# Patient Record
Sex: FEMALE | Race: WHITE | URBAN - METROPOLITAN AREA
[De-identification: names, ages, dates, MRNs, and addresses within clinical notes are randomized per-mention and may not be internally consistent; named-entity substitution may affect disease eponyms.]

---

## 2022-12-29 ENCOUNTER — APPOINTMENT (OUTPATIENT)
Dept: CT IMAGING | Age: 77
DRG: 483 | End: 2022-12-29
Payer: MEDICARE

## 2022-12-29 ENCOUNTER — APPOINTMENT (OUTPATIENT)
Dept: GENERAL RADIOLOGY | Age: 77
DRG: 483 | End: 2022-12-29
Payer: MEDICARE

## 2022-12-29 ENCOUNTER — HOSPITAL ENCOUNTER (INPATIENT)
Age: 77
LOS: 5 days | Discharge: NURSING, REHAB OR LONG-TERM CARE | DRG: 483 | End: 2023-01-03
Attending: EMERGENCY MEDICINE | Admitting: INTERNAL MEDICINE
Payer: MEDICARE

## 2022-12-29 DIAGNOSIS — S42.211A FX HUMERAL NECK, RIGHT, CLOSED, INITIAL ENCOUNTER: ICD-10-CM
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
## 2022-12-29 DIAGNOSIS — S82.832A CLOSED FRACTURE OF DISTAL END OF LEFT FIBULA, UNSPECIFIED FRACTURE MORPHOLOGY, INITIAL ENCOUNTER: ICD-10-CM

## 2022-12-29 PROBLEM — R00.0 SINUS TACHYCARDIA: Status: ACTIVE | Noted: 2022-12-29

## 2022-12-29 PROBLEM — S82.201B TIBIA/FIBULA FRACTURE, RIGHT, OPEN TYPE I OR II, INITIAL ENCOUNTER: Status: ACTIVE | Noted: 2022-12-29

## 2022-12-29 PROBLEM — S82.401B TIBIA/FIBULA FRACTURE, RIGHT, OPEN TYPE I OR II, INITIAL ENCOUNTER: Status: ACTIVE | Noted: 2022-12-29

## 2022-12-29 PROBLEM — E66.01 MORBID OBESITY (HCC): Status: ACTIVE | Noted: 2022-12-29

## 2022-12-29 LAB
ABO/RH: NORMAL
ANION GAP SERPL CALCULATED.3IONS-SCNC: 17 MMOL/L (ref 3–16)
ANTIBODY SCREEN: NORMAL
BASOPHILS ABSOLUTE: 0 K/UL (ref 0–0.2)
BASOPHILS RELATIVE PERCENT: 0.2 %
BILIRUBIN URINE: NEGATIVE
BLOOD, URINE: NEGATIVE
BUN BLDV-MCNC: 21 MG/DL (ref 7–20)
CALCIUM SERPL-MCNC: 9.7 MG/DL (ref 8.3–10.6)
CHLORIDE BLD-SCNC: 100 MMOL/L (ref 99–110)
CLARITY: CLEAR
CO2: 21 MMOL/L (ref 21–32)
COLOR: YELLOW
CREAT SERPL-MCNC: 0.9 MG/DL (ref 0.6–1.2)
EOSINOPHILS ABSOLUTE: 0.1 K/UL (ref 0–0.6)
EOSINOPHILS RELATIVE PERCENT: 0.3 %
GFR SERPL CREATININE-BSD FRML MDRD: >60 ML/MIN/{1.73_M2}
GLUCOSE BLD-MCNC: 135 MG/DL (ref 70–99)
GLUCOSE URINE: NEGATIVE MG/DL
HCT VFR BLD CALC: 36.4 % (ref 36–48)
HEMOGLOBIN: 12.5 G/DL (ref 12–16)
KETONES, URINE: NEGATIVE MG/DL
LEUKOCYTE ESTERASE, URINE: NEGATIVE
LYMPHOCYTES ABSOLUTE: 2.1 K/UL (ref 1–5.1)
LYMPHOCYTES RELATIVE PERCENT: 10.2 %
MCH RBC QN AUTO: 32.3 PG (ref 26–34)
MCHC RBC AUTO-ENTMCNC: 34.4 G/DL (ref 31–36)
MCV RBC AUTO: 94.2 FL (ref 80–100)
MICROSCOPIC EXAMINATION: NORMAL
MONOCYTES ABSOLUTE: 1.5 K/UL (ref 0–1.3)
MONOCYTES RELATIVE PERCENT: 7.3 %
NEUTROPHILS ABSOLUTE: 16.6 K/UL (ref 1.7–7.7)
NEUTROPHILS RELATIVE PERCENT: 82 %
NITRITE, URINE: NEGATIVE
PDW BLD-RTO: 13 % (ref 12.4–15.4)
PH UA: 5.5 (ref 5–8)
PLATELET # BLD: 332 K/UL (ref 135–450)
PMV BLD AUTO: 8.4 FL (ref 5–10.5)
POTASSIUM REFLEX MAGNESIUM: 3.8 MMOL/L (ref 3.5–5.1)
PROTEIN UA: NEGATIVE MG/DL
RBC # BLD: 3.86 M/UL (ref 4–5.2)
SODIUM BLD-SCNC: 138 MMOL/L (ref 136–145)
SPECIFIC GRAVITY UA: >=1.03 (ref 1–1.03)
URINE REFLEX TO CULTURE: NORMAL
URINE TYPE: NORMAL
UROBILINOGEN, URINE: 0.2 E.U./DL
WBC # BLD: 20.3 K/UL (ref 4–11)

## 2022-12-29 PROCEDURE — 2580000003 HC RX 258: Performed by: INTERNAL MEDICINE

## 2022-12-29 PROCEDURE — 6360000002 HC RX W HCPCS: Performed by: STUDENT IN AN ORGANIZED HEALTH CARE EDUCATION/TRAINING PROGRAM

## 2022-12-29 PROCEDURE — 73030 X-RAY EXAM OF SHOULDER: CPT

## 2022-12-29 PROCEDURE — 96374 THER/PROPH/DIAG INJ IV PUSH: CPT

## 2022-12-29 PROCEDURE — 73200 CT UPPER EXTREMITY W/O DYE: CPT

## 2022-12-29 PROCEDURE — 51701 INSERT BLADDER CATHETER: CPT

## 2022-12-29 PROCEDURE — 70450 CT HEAD/BRAIN W/O DYE: CPT

## 2022-12-29 PROCEDURE — 73060 X-RAY EXAM OF HUMERUS: CPT

## 2022-12-29 PROCEDURE — 1200000000 HC SEMI PRIVATE

## 2022-12-29 PROCEDURE — 2580000003 HC RX 258: Performed by: STUDENT IN AN ORGANIZED HEALTH CARE EDUCATION/TRAINING PROGRAM

## 2022-12-29 PROCEDURE — 71045 X-RAY EXAM CHEST 1 VIEW: CPT

## 2022-12-29 PROCEDURE — 99285 EMERGENCY DEPT VISIT HI MDM: CPT

## 2022-12-29 PROCEDURE — 90715 TDAP VACCINE 7 YRS/> IM: CPT | Performed by: STUDENT IN AN ORGANIZED HEALTH CARE EDUCATION/TRAINING PROGRAM

## 2022-12-29 PROCEDURE — 86850 RBC ANTIBODY SCREEN: CPT

## 2022-12-29 PROCEDURE — 90471 IMMUNIZATION ADMIN: CPT | Performed by: STUDENT IN AN ORGANIZED HEALTH CARE EDUCATION/TRAINING PROGRAM

## 2022-12-29 PROCEDURE — A4216 STERILE WATER/SALINE, 10 ML: HCPCS | Performed by: INTERNAL MEDICINE

## 2022-12-29 PROCEDURE — 73590 X-RAY EXAM OF LOWER LEG: CPT

## 2022-12-29 PROCEDURE — 81003 URINALYSIS AUTO W/O SCOPE: CPT

## 2022-12-29 PROCEDURE — 72125 CT NECK SPINE W/O DYE: CPT

## 2022-12-29 PROCEDURE — 86901 BLOOD TYPING SEROLOGIC RH(D): CPT

## 2022-12-29 PROCEDURE — 6370000000 HC RX 637 (ALT 250 FOR IP): Performed by: STUDENT IN AN ORGANIZED HEALTH CARE EDUCATION/TRAINING PROGRAM

## 2022-12-29 PROCEDURE — 73610 X-RAY EXAM OF ANKLE: CPT

## 2022-12-29 PROCEDURE — 6370000000 HC RX 637 (ALT 250 FOR IP): Performed by: INTERNAL MEDICINE

## 2022-12-29 PROCEDURE — 85025 COMPLETE CBC W/AUTO DIFF WBC: CPT

## 2022-12-29 PROCEDURE — 86900 BLOOD TYPING SEROLOGIC ABO: CPT

## 2022-12-29 PROCEDURE — 96375 TX/PRO/DX INJ NEW DRUG ADDON: CPT

## 2022-12-29 PROCEDURE — 76377 3D RENDER W/INTRP POSTPROCES: CPT

## 2022-12-29 PROCEDURE — 80048 BASIC METABOLIC PNL TOTAL CA: CPT

## 2022-12-29 PROCEDURE — 2500000003 HC RX 250 WO HCPCS: Performed by: INTERNAL MEDICINE

## 2022-12-29 RX ORDER — MORPHINE SULFATE 2 MG/ML
2 INJECTION, SOLUTION INTRAMUSCULAR; INTRAVENOUS
Status: DISCONTINUED | OUTPATIENT
Start: 2022-12-29 | End: 2023-01-03 | Stop reason: HOSPADM

## 2022-12-29 RX ORDER — SODIUM CHLORIDE 0.9 % (FLUSH) 0.9 %
5-40 SYRINGE (ML) INJECTION EVERY 12 HOURS SCHEDULED
Status: DISCONTINUED | OUTPATIENT
Start: 2022-12-29 | End: 2023-01-03 | Stop reason: HOSPADM

## 2022-12-29 RX ORDER — OXYCODONE HYDROCHLORIDE 5 MG/1
5 TABLET ORAL ONCE
Status: COMPLETED | OUTPATIENT
Start: 2022-12-29 | End: 2022-12-29

## 2022-12-29 RX ORDER — SODIUM CHLORIDE, SODIUM LACTATE, POTASSIUM CHLORIDE, CALCIUM CHLORIDE 600; 310; 30; 20 MG/100ML; MG/100ML; MG/100ML; MG/100ML
1000 INJECTION, SOLUTION INTRAVENOUS CONTINUOUS
Status: ACTIVE | OUTPATIENT
Start: 2022-12-29 | End: 2022-12-29

## 2022-12-29 RX ORDER — LEVOTHYROXINE SODIUM 175 UG/1
175 TABLET ORAL DAILY
Status: DISCONTINUED | OUTPATIENT
Start: 2022-12-30 | End: 2023-01-03 | Stop reason: HOSPADM

## 2022-12-29 RX ORDER — ACETAMINOPHEN 650 MG/1
650 SUPPOSITORY RECTAL EVERY 6 HOURS PRN
Status: DISCONTINUED | OUTPATIENT
Start: 2022-12-29 | End: 2023-01-03 | Stop reason: HOSPADM

## 2022-12-29 RX ORDER — ACETAMINOPHEN 325 MG/1
650 TABLET ORAL EVERY 6 HOURS PRN
Status: DISCONTINUED | OUTPATIENT
Start: 2022-12-29 | End: 2023-01-03 | Stop reason: HOSPADM

## 2022-12-29 RX ORDER — SODIUM CHLORIDE 9 MG/ML
INJECTION, SOLUTION INTRAVENOUS PRN
Status: DISCONTINUED | OUTPATIENT
Start: 2022-12-29 | End: 2023-01-03 | Stop reason: HOSPADM

## 2022-12-29 RX ORDER — ONDANSETRON 2 MG/ML
4 INJECTION INTRAMUSCULAR; INTRAVENOUS EVERY 6 HOURS PRN
Status: DISCONTINUED | OUTPATIENT
Start: 2022-12-29 | End: 2023-01-03 | Stop reason: HOSPADM

## 2022-12-29 RX ORDER — MORPHINE SULFATE 2 MG/ML
4 INJECTION, SOLUTION INTRAMUSCULAR; INTRAVENOUS
Status: DISCONTINUED | OUTPATIENT
Start: 2022-12-29 | End: 2023-01-03 | Stop reason: HOSPADM

## 2022-12-29 RX ORDER — ONDANSETRON 2 MG/ML
4 INJECTION INTRAMUSCULAR; INTRAVENOUS EVERY 6 HOURS PRN
Status: DISCONTINUED | OUTPATIENT
Start: 2022-12-29 | End: 2022-12-29 | Stop reason: SDUPTHER

## 2022-12-29 RX ORDER — POLYETHYLENE GLYCOL 3350 17 G/17G
17 POWDER, FOR SOLUTION ORAL DAILY PRN
Status: DISCONTINUED | OUTPATIENT
Start: 2022-12-29 | End: 2023-01-03 | Stop reason: HOSPADM

## 2022-12-29 RX ORDER — SODIUM CHLORIDE 0.9 % (FLUSH) 0.9 %
5-40 SYRINGE (ML) INJECTION PRN
Status: DISCONTINUED | OUTPATIENT
Start: 2022-12-29 | End: 2023-01-03 | Stop reason: HOSPADM

## 2022-12-29 RX ORDER — OXYCODONE HYDROCHLORIDE AND ACETAMINOPHEN 5; 325 MG/1; MG/1
2 TABLET ORAL EVERY 4 HOURS PRN
Status: DISCONTINUED | OUTPATIENT
Start: 2022-12-29 | End: 2023-01-03 | Stop reason: HOSPADM

## 2022-12-29 RX ORDER — ACETAMINOPHEN 325 MG/1
650 TABLET ORAL ONCE
Status: COMPLETED | OUTPATIENT
Start: 2022-12-29 | End: 2022-12-29

## 2022-12-29 RX ORDER — OXYCODONE HYDROCHLORIDE AND ACETAMINOPHEN 5; 325 MG/1; MG/1
1 TABLET ORAL EVERY 4 HOURS PRN
Status: DISCONTINUED | OUTPATIENT
Start: 2022-12-29 | End: 2023-01-03 | Stop reason: HOSPADM

## 2022-12-29 RX ORDER — 0.9 % SODIUM CHLORIDE 0.9 %
1000 INTRAVENOUS SOLUTION INTRAVENOUS ONCE
Status: COMPLETED | OUTPATIENT
Start: 2022-12-29 | End: 2022-12-29

## 2022-12-29 RX ORDER — METHOCARBAMOL 500 MG/1
500 TABLET, FILM COATED ORAL ONCE
Status: COMPLETED | OUTPATIENT
Start: 2022-12-29 | End: 2022-12-29

## 2022-12-29 RX ORDER — IBUPROFEN 400 MG/1
800 TABLET ORAL ONCE
Status: COMPLETED | OUTPATIENT
Start: 2022-12-29 | End: 2022-12-29

## 2022-12-29 RX ORDER — ONDANSETRON 4 MG/1
4 TABLET, ORALLY DISINTEGRATING ORAL EVERY 8 HOURS PRN
Status: DISCONTINUED | OUTPATIENT
Start: 2022-12-29 | End: 2023-01-03 | Stop reason: HOSPADM

## 2022-12-29 RX ADMIN — HYDROMORPHONE HYDROCHLORIDE 1 MG: 1 INJECTION, SOLUTION INTRAMUSCULAR; INTRAVENOUS; SUBCUTANEOUS at 17:46

## 2022-12-29 RX ADMIN — OXYCODONE HYDROCHLORIDE 5 MG: 5 TABLET ORAL at 15:18

## 2022-12-29 RX ADMIN — OXYCODONE AND ACETAMINOPHEN 2 TABLET: 5; 325 TABLET ORAL at 22:57

## 2022-12-29 RX ADMIN — ACETAMINOPHEN 650 MG: 325 TABLET ORAL at 15:18

## 2022-12-29 RX ADMIN — METHOCARBAMOL 500 MG: 500 TABLET ORAL at 15:18

## 2022-12-29 RX ADMIN — ONDANSETRON 4 MG: 2 INJECTION INTRAMUSCULAR; INTRAVENOUS at 17:46

## 2022-12-29 RX ADMIN — TETANUS TOXOID, REDUCED DIPHTHERIA TOXOID AND ACELLULAR PERTUSSIS VACCINE, ADSORBED 0.5 ML: 5; 2.5; 8; 8; 2.5 SUSPENSION INTRAMUSCULAR at 15:19

## 2022-12-29 RX ADMIN — FAMOTIDINE 20 MG: 10 INJECTION, SOLUTION INTRAVENOUS at 22:57

## 2022-12-29 RX ADMIN — IBUPROFEN 800 MG: 400 TABLET, FILM COATED ORAL at 15:18

## 2022-12-29 RX ADMIN — SODIUM CHLORIDE 1000 ML: 0.9 INJECTION, SOLUTION INTRAVENOUS at 20:57

## 2022-12-29 RX ADMIN — SODIUM CHLORIDE, POTASSIUM CHLORIDE, SODIUM LACTATE AND CALCIUM CHLORIDE 1000 ML: 600; 310; 30; 20 INJECTION, SOLUTION INTRAVENOUS at 17:45

## 2022-12-29 RX ADMIN — SODIUM CHLORIDE, PRESERVATIVE FREE 10 ML: 5 INJECTION INTRAVENOUS at 22:59

## 2022-12-29 ASSESSMENT — PAIN DESCRIPTION - LOCATION
LOCATION_2: SHOULDER
LOCATION: ANKLE

## 2022-12-29 ASSESSMENT — PAIN DESCRIPTION - DESCRIPTORS
DESCRIPTORS: ACHING;DISCOMFORT
DESCRIPTORS_2: ACHING;DISCOMFORT

## 2022-12-29 ASSESSMENT — PAIN SCALES - GENERAL
PAINLEVEL_OUTOF10: 10
PAINLEVEL_OUTOF10: 0
PAINLEVEL_OUTOF10: 7

## 2022-12-29 ASSESSMENT — PAIN DESCRIPTION - PAIN TYPE: TYPE: ACUTE PAIN

## 2022-12-29 ASSESSMENT — PAIN DESCRIPTION - FREQUENCY: FREQUENCY: CONTINUOUS

## 2022-12-29 ASSESSMENT — PAIN - FUNCTIONAL ASSESSMENT
PAIN_FUNCTIONAL_ASSESSMENT: PREVENTS OR INTERFERES SOME ACTIVE ACTIVITIES AND ADLS
PAIN_FUNCTIONAL_ASSESSMENT_SITE2: PREVENTS OR INTERFERES SOME ACTIVE ACTIVITIES AND ADLS
PAIN_FUNCTIONAL_ASSESSMENT: 0-10
PAIN_FUNCTIONAL_ASSESSMENT: 0-10

## 2022-12-29 ASSESSMENT — PAIN DESCRIPTION - INTENSITY: RATING_2: 7

## 2022-12-29 ASSESSMENT — ENCOUNTER SYMPTOMS
NAUSEA: 0
COUGH: 0
EYE DISCHARGE: 0
RHINORRHEA: 0
BACK PAIN: 0
SHORTNESS OF BREATH: 0
EYE REDNESS: 0
VOMITING: 0
ABDOMINAL PAIN: 0

## 2022-12-29 ASSESSMENT — PAIN DESCRIPTION - ORIENTATION
ORIENTATION_2: RIGHT
ORIENTATION: LEFT

## 2022-12-29 ASSESSMENT — PAIN DESCRIPTION - ONSET: ONSET: ON-GOING

## 2022-12-29 NOTE — ED PROVIDER NOTES
ED Attending Attestation Note     Date of evaluation: 12/29/2022    This patient was seen by the resident. I have seen and examined the patient, agree with the workup, evaluation, management and diagnosis. The care plan has been discussed. My assessment reveals a 67-year-old female who presents with a chief complaint of fall. Patient had a mechanical fall. Complaining of pain in her arm and leg. On exam patient with ecchymosis around her left eye, good extraocular movements. Some bruising to the right shoulder noted. Benign abdomen. Claribel Ornelas GCS 15.      Angelica Martines MD  12/29/22 [id number]

## 2022-12-29 NOTE — ED PROVIDER NOTES
[facility]          EM RESIDENT NOTE       Date of evaluation: 12/29/2022    Chief Complaint   Fall    History of Present Illness     Jewels Raygoza is a 69 y.o. female with history of arthritis, HTN, HLD, rheumatic fever who presents right arm pain after a fall. Patient reports that she was walking outside when she tripped on some rocks and fell forward. She struck her left forehead, left upper arm, and right upper arm. She subsequently called 911 and was brought into the emergency department by EMS for further evaluation. Her primary complaint is of right upper arm pain. Describes a moderate intensity, sharp pain that is worse with movement and improved when elevated off of the bed. She did not lose consciousness but did hit the left side of her head. She has no pain with movement of her eyes. She has no nausea, vomiting, or abdominal pain. She has no chest pain or trouble breathing. She has no headache or neck pain. She is not on a blood thinner. Review of Systems     Review of Systems   Constitutional:  Negative for chills and fever. HENT:  Negative for congestion and rhinorrhea. Eyes:  Negative for discharge and redness. Respiratory:  Negative for cough and shortness of breath. Cardiovascular:  Negative for chest pain and palpitations. Gastrointestinal:  Negative for abdominal pain, nausea and vomiting. Genitourinary:  Negative for dysuria and hematuria. Musculoskeletal:  Positive for myalgias. Negative for back pain and joint swelling. Skin:  Positive for wound. Negative for rash. Neurological:  Negative for seizures and syncope. Hematological:  Negative for adenopathy. Does not bruise/bleed easily. Psychiatric/Behavioral:  Negative for agitation and behavioral problems.       Past Medical, Surgical, Family, and Social History     She has a past medical history of Arthritis, Blood transfusion, Hyperlipidemia, Hypertension, Hypothyroid, and Rheumatic fever. She has a past surgical history that includes Bunionectomy (1993) and Cholecystectomy (1997). Her family history includes Breast Cancer in her sister; Cancer in her sister; Diabetes in her mother; Hypertension in her mother. She reports that she quit smoking about 10 years ago. She has never used smokeless tobacco. She reports that she does not drink alcohol and does not use drugs. Medications     Previous Medications    CYANOCOBALAMIN 1000 MCG/ML INJECTION    Inject 1 mL into the skin once for 1 dose. LEVOTHYROXINE (SYNTHROID) 175 MCG TABLET    Take 1 tablet by mouth  daily. Take additional 1/2  tablet by mouth on Monday  and Wednesday    LISINOPRIL-HYDROCHLOROTHIAZIDE (PRINZIDE;ZESTORETIC) 20-25 MG PER TABLET    Take 0.5 tablets by mouth daily. MELOXICAM (MOBIC) 15 MG TABLET    TAKE ONE TABLET BY MOUTH EVERY DAY    MULTIVITAMIN (THERAGRAN) PER TABLET    Take 1 tablet by mouth daily. Allergies     She has No Known Allergies.     Physical Exam     INITIAL VITALS: BP: (!) 167/77, Temp: 98.8 °F (37.1 °C), Heart Rate: 88, Resp: 18, SpO2: 97 %   Physical Exam  Constitutional: 69 y.o. female, well-appearing, NAD, non-toxic  Head: normocephalic, ecchymosis and swelling over left forehead  Eyes: anicteric, PERRL, EOMI   ENT: no nasal discharge, no epistaxis, no septal hematoma, mucous membranes are moist, no intraoral bleeding or lesion  Neck: supple, full ROM, trachea midline, no midline cervical spine tenderness  Pulmonary: lungs clear to auscultation bilaterally with symmetric aeration, no wheezes/rales/rhonchi, no stridor   Cardiac: regular rate and regular rhythm, no murmurs/rubs/gallops   Abdomen: soft, non-tender, non-distended, there is a small abrasion over anterior abdominal skin  Extremities: warm and well-perfused, no apparent long bone deformity, no peripheral edema, ecchymosis over left humerus, tenderness to palpation over left humerus, normal ROM of elbow and hand on right, normal ROM of left upper extremity, there is an abrasion over left ankle  Skin: no rashes, some ecchymosis as noted before  Neuro: alert and oriented x3, speech normal, moves upper and lower extremities spontaneously and symmetrically  Psych: mood and affect appropriate for situation     DiagnosticResults   RADIOLOGY:  CT CERVICAL SPINE WO CONTRAST   Final Result      No fracture seen. Moderately advanced multilevel spondylotic change. CT HEAD WO CONTRAST   Final Result      No evidence of acute intracranial abnormality. XR CHEST PORTABLE   Final Result      Cardiomegaly. No acute pulmonary abnormality. XR HUMERUS RIGHT (MIN 2 VIEWS)   Final Result      Acute impacted fracture of the right humeral neck and humeral head         2 VIEWS OF THE LEFT HUMERUS on 12/29/2022      HISTORY: Pain and trauma. PROCEDURE:      AP and lateral  views were obtained. FINDINGS:      There is no sign of a discrete fracture-dislocation or joint effusion. There is no bony defect identified or loose body. There is no radiopaque foreign body      The AC joint and glenohumeral joint reveal degenerative changes      IMPRESSION:      No sign of any fracture or dislocation. Chronic degenerative changes of the glenohumeral and AC joint. TWO VIEWS OF THE  RIGHT SHOULDER on 12/29/2022      HISTORY: shoulder pain. PROCEDURE:      AP internal/external rotation views were obtained. FINDINGS:      There is impacted right proximal humeral neck oblique fracture and comminuted humeral head fracture with multiple fragments and lateral positioning of the distal humerus in relation humeral head. There is AC joint hypertrophy. No sign of any dislocation. IMPRESSION:      Comminuted and impacted right humeral neck and head fracture, without gross dislocation      LEFT TIBIA/FIBULA 2 VIEWS on 12/29/2022      HISTORY: Pain.  Fell      PROCEDURE:      AP and lateral views were obtained, including the knee and ankle joints. FINDINGS:      There is normal alignment of the proximal and mid tibia and fibula. There is distal oblique fibular and lateral malleolus fracture. The knee is severely arthritic with severe medial lateral compartment narrowing and condylar spurring. There is no    radiopaque foreign body. IMPRESSION:      Normal radiographs of the proximal left tibia and fibula. Advanced arthritic changes noted at the knee with severe medial and lateral compartment narrowing condylar spurring and demineralization. . Distal left fibular fracture is noted extending in the    lateral malleolus         XR HUMERUS LEFT (MIN 2 VIEWS)   Final Result      Acute impacted fracture of the right humeral neck and humeral head         2 VIEWS OF THE LEFT HUMERUS on 12/29/2022      HISTORY: Pain and trauma. PROCEDURE:      AP and lateral  views were obtained. FINDINGS:      There is no sign of a discrete fracture-dislocation or joint effusion. There is no bony defect identified or loose body. There is no radiopaque foreign body      The AC joint and glenohumeral joint reveal degenerative changes      IMPRESSION:      No sign of any fracture or dislocation. Chronic degenerative changes of the glenohumeral and AC joint. TWO VIEWS OF THE  RIGHT SHOULDER on 12/29/2022      HISTORY: shoulder pain. PROCEDURE:      AP internal/external rotation views were obtained. FINDINGS:      There is impacted right proximal humeral neck oblique fracture and comminuted humeral head fracture with multiple fragments and lateral positioning of the distal humerus in relation humeral head. There is AC joint hypertrophy. No sign of any dislocation. IMPRESSION:      Comminuted and impacted right humeral neck and head fracture, without gross dislocation      LEFT TIBIA/FIBULA 2 VIEWS on 12/29/2022      HISTORY: Pain.  Fell      PROCEDURE:      AP and lateral views were obtained, including the knee and ankle joints. FINDINGS:      There is normal alignment of the proximal and mid tibia and fibula. There is distal oblique fibular and lateral malleolus fracture. The knee is severely arthritic with severe medial lateral compartment narrowing and condylar spurring. There is no    radiopaque foreign body. IMPRESSION:      Normal radiographs of the proximal left tibia and fibula. Advanced arthritic changes noted at the knee with severe medial and lateral compartment narrowing condylar spurring and demineralization. . Distal left fibular fracture is noted extending in the    lateral malleolus         XR SHOULDER RIGHT (MIN 2 VIEWS)   Final Result      Acute impacted fracture of the right humeral neck and humeral head         2 VIEWS OF THE LEFT HUMERUS on 12/29/2022      HISTORY: Pain and trauma. PROCEDURE:      AP and lateral  views were obtained. FINDINGS:      There is no sign of a discrete fracture-dislocation or joint effusion. There is no bony defect identified or loose body. There is no radiopaque foreign body      The AC joint and glenohumeral joint reveal degenerative changes      IMPRESSION:      No sign of any fracture or dislocation. Chronic degenerative changes of the glenohumeral and AC joint. TWO VIEWS OF THE  RIGHT SHOULDER on 12/29/2022      HISTORY: shoulder pain. PROCEDURE:      AP internal/external rotation views were obtained. FINDINGS:      There is impacted right proximal humeral neck oblique fracture and comminuted humeral head fracture with multiple fragments and lateral positioning of the distal humerus in relation humeral head. There is AC joint hypertrophy. No sign of any dislocation. IMPRESSION:      Comminuted and impacted right humeral neck and head fracture, without gross dislocation      LEFT TIBIA/FIBULA 2 VIEWS on 12/29/2022      HISTORY: Pain.  Fell      PROCEDURE:      AP and lateral views were obtained, including the knee and ankle joints. FINDINGS:      There is normal alignment of the proximal and mid tibia and fibula. There is distal oblique fibular and lateral malleolus fracture. The knee is severely arthritic with severe medial lateral compartment narrowing and condylar spurring. There is no    radiopaque foreign body. IMPRESSION:      Normal radiographs of the proximal left tibia and fibula. Advanced arthritic changes noted at the knee with severe medial and lateral compartment narrowing condylar spurring and demineralization. . Distal left fibular fracture is noted extending in the    lateral malleolus         XR ANKLE LEFT (MIN 3 VIEWS)   Final Result      Comminuted oblique fracture of the distal fibula and lateral malleolus extending into the syndesmosis of the ankle without discrete fracture of the posterior or medial malleolus at this time. Diffuse soft tissue swelling noted         XR TIBIA FIBULA LEFT (2 VIEWS)   Final Result      Acute impacted fracture of the right humeral neck and humeral head         2 VIEWS OF THE LEFT HUMERUS on 12/29/2022      HISTORY: Pain and trauma. PROCEDURE:      AP and lateral  views were obtained. FINDINGS:      There is no sign of a discrete fracture-dislocation or joint effusion. There is no bony defect identified or loose body. There is no radiopaque foreign body      The AC joint and glenohumeral joint reveal degenerative changes      IMPRESSION:      No sign of any fracture or dislocation. Chronic degenerative changes of the glenohumeral and AC joint. TWO VIEWS OF THE  RIGHT SHOULDER on 12/29/2022      HISTORY: shoulder pain. PROCEDURE:      AP internal/external rotation views were obtained. FINDINGS:      There is impacted right proximal humeral neck oblique fracture and comminuted humeral head fracture with multiple fragments and lateral positioning of the distal humerus in relation humeral head. There is AC joint hypertrophy.  No sign of any dislocation. IMPRESSION:      Comminuted and impacted right humeral neck and head fracture, without gross dislocation      LEFT TIBIA/FIBULA 2 VIEWS on 12/29/2022      HISTORY: Pain. Fell      PROCEDURE:      AP and lateral views were obtained, including the knee and ankle joints. FINDINGS:      There is normal alignment of the proximal and mid tibia and fibula. There is distal oblique fibular and lateral malleolus fracture. The knee is severely arthritic with severe medial lateral compartment narrowing and condylar spurring. There is no    radiopaque foreign body. IMPRESSION:      Normal radiographs of the proximal left tibia and fibula. Advanced arthritic changes noted at the knee with severe medial and lateral compartment narrowing condylar spurring and demineralization. . Distal left fibular fracture is noted extending in the    lateral malleolus             LABS:   Results for orders placed or performed during the hospital encounter of 12/29/22   BMP w/ Reflex to MG   Result Value Ref Range    Sodium 138 136 - 145 mmol/L    Potassium reflex Magnesium 3.8 3.5 - 5.1 mmol/L    Chloride 100 99 - 110 mmol/L    CO2 21 21 - 32 mmol/L    Anion Gap 17 (H) 3 - 16    Glucose 135 (H) 70 - 99 mg/dL    BUN 21 (H) 7 - 20 mg/dL    Creatinine 0.9 0.6 - 1.2 mg/dL    Est, Glom Filt Rate >60 >60    Calcium 9.7 8.3 - 10.6 mg/dL   CBC with Auto Differential   Result Value Ref Range    WBC 20.3 (H) 4.0 - 11.0 K/uL    RBC 3.86 (L) 4.00 - 5.20 M/uL    Hemoglobin 12.5 12.0 - 16.0 g/dL    Hematocrit 36.4 36.0 - 48.0 %    MCV 94.2 80.0 - 100.0 fL    MCH 32.3 26.0 - 34.0 pg    MCHC 34.4 31.0 - 36.0 g/dL    RDW 13.0 12.4 - 15.4 %    Platelets 737 576 - 027 K/uL    MPV 8.4 5.0 - 10.5 fL    Neutrophils % 82.0 %    Lymphocytes % 10.2 %    Monocytes % 7.3 %    Eosinophils % 0.3 %    Basophils % 0.2 %    Neutrophils Absolute 16.6 (H) 1.7 - 7.7 K/uL    Lymphocytes Absolute 2.1 1.0 - 5.1 K/uL    Monocytes Absolute 1.5 (H) 0.0 - 1.3 K/uL    Eosinophils Absolute 0.1 0.0 - 0.6 K/uL    Basophils Absolute 0.0 0.0 - 0.2 K/uL   Urinalysis with Reflex to Culture    Specimen: Urine   Result Value Ref Range    Color, UA Yellow Straw/Yellow    Clarity, UA Clear Clear    Glucose, Ur Negative Negative mg/dL    Bilirubin Urine Negative Negative    Ketones, Urine Negative Negative mg/dL    Specific Gravity, UA >=1.030 1.005 - 1.030    Blood, Urine Negative Negative    pH, UA 5.5 5.0 - 8.0    Protein, UA Negative Negative mg/dL    Urobilinogen, Urine 0.2 <2.0 E.U./dL    Nitrite, Urine Negative Negative    Leukocyte Esterase, Urine Negative Negative    Microscopic Examination Not Indicated     Urine Type NotGiven     Urine Reflex to Culture Not Indicated    TYPE AND SCREEN   Result Value Ref Range    ABO/Rh O POS     Antibody Screen NEG        RECENT VITALS:  BP: 124/62, Temp: 98.8 °F (37.1 °C), Heart Rate: (!) 126,Resp: 20, SpO2: 92 %       ED Course     Nursing Notes, Past Medical Hx, Past Surgical Hx, Social Hx, Allergies, and Family Hx were reviewed. The patient was given the followingmedications:  Orders Placed This Encounter   Medications    oxyCODONE (ROXICODONE) immediate release tablet 5 mg    acetaminophen (TYLENOL) tablet 650 mg    ibuprofen (ADVIL;MOTRIN) tablet 800 mg    methocarbamol (ROBAXIN) tablet 500 mg    Tetanus-Diphth-Acell Pertussis (BOOSTRIX) injection 0.5 mL    HYDROmorphone (DILAUDID) injection 1 mg    ondansetron (ZOFRAN) injection 4 mg    lactated ringers infusion 1,000 mL    0.9 % sodium chloride bolus       CONSULTS:  IP CONSULT TO HOSPITALIST  IP CONSULT TO Carey Mayes / ASSESSMENT / Micah Thomas is a 69 y.o. female with history of arthritis, HTN, HLD, rheumatic fever who presents right arm pain after a mechanical fall. Initial impression notable for 30-year-old woman who was uncomfortable but otherwise nontoxic appearing.   Initial pain management with Tylenol, oxycodone, Robaxin, and ibuprofen. x-ray imaging of the chest was negative. X-ray imaging of the right humerus and shoulder was notable for a right humeral neck fracture. X-ray imaging of the left humerus is negative. X-ray imaging of the left ankle and tib-fib notable for a distal fibular fracture. Case discussed with orthopedics who recommended that the patient be n.p.o. at midnight. At this time IV access established and laboratory evaluation for possible operative intervention sent. Laboratory evaluation reviewed. CBC with leukocytosis at 20. Suspect reactive leukocytosis given traumatic injuries. There is no evidence of pneumonia on chest x-ray and her urinalysis is not consistent with a urinary tract infection. She has no other infectious signs or symptoms on evaluation she has a soft and benign abdomen. BMP is without electrolyte derangement or kidney injury. Further pain management given with Dilaudid. Case discussed with the hospital medicine service who kindly agreed to admit the patient. This patient was also evaluated by the attending physician. All care plans were discussed and agreed upon. Clinical Impression     1. Fall from standing, initial encounter    2. Fx humeral neck, right, closed, initial encounter    3.  Closed fracture of distal end of left fibula, unspecified fracture morphology, initial encounter        Disposition   DISPOSITION Admitted 12/29/2022 06:41:04 PM      Latanya Luevano MD  Resident  12/29/22 [id number]

## 2022-12-29 NOTE — ED NOTES
Back board removed from patient by this RN and radiology.    ER MD at bedside while back board was removed      Ben Weinberg RN  12/29/22 [phone]

## 2022-12-30 ENCOUNTER — ANESTHESIA EVENT (OUTPATIENT)
Dept: OPERATING ROOM | Age: 77
End: 2022-12-30
Payer: MEDICARE

## 2022-12-30 ENCOUNTER — APPOINTMENT (OUTPATIENT)
Dept: GENERAL RADIOLOGY | Age: 77
DRG: 483 | End: 2022-12-30
Payer: MEDICARE

## 2022-12-30 LAB
ANION GAP SERPL CALCULATED.3IONS-SCNC: 14 MMOL/L (ref 3–16)
BASOPHILS ABSOLUTE: 0 K/UL (ref 0–0.2)
BASOPHILS RELATIVE PERCENT: 0.6 %
BUN BLDV-MCNC: 19 MG/DL (ref 7–20)
CALCIUM SERPL-MCNC: 9.6 MG/DL (ref 8.3–10.6)
CHLORIDE BLD-SCNC: 101 MMOL/L (ref 99–110)
CO2: 23 MMOL/L (ref 21–32)
CREAT SERPL-MCNC: 1 MG/DL (ref 0.6–1.2)
EOSINOPHILS ABSOLUTE: 0 K/UL (ref 0–0.6)
EOSINOPHILS RELATIVE PERCENT: 0.5 %
GFR SERPL CREATININE-BSD FRML MDRD: 58 ML/MIN/{1.73_M2}
GLUCOSE BLD-MCNC: 118 MG/DL (ref 70–99)
HCT VFR BLD CALC: 31 % (ref 36–48)
HEMOGLOBIN: 10.6 G/DL (ref 12–16)
LYMPHOCYTES ABSOLUTE: 1.1 K/UL (ref 1–5.1)
LYMPHOCYTES RELATIVE PERCENT: 12.8 %
MCH RBC QN AUTO: 32.6 PG (ref 26–34)
MCHC RBC AUTO-ENTMCNC: 34.1 G/DL (ref 31–36)
MCV RBC AUTO: 95.6 FL (ref 80–100)
MONOCYTES ABSOLUTE: 1 K/UL (ref 0–1.3)
MONOCYTES RELATIVE PERCENT: 12.2 %
NEUTROPHILS ABSOLUTE: 6.3 K/UL (ref 1.7–7.7)
NEUTROPHILS RELATIVE PERCENT: 73.9 %
PDW BLD-RTO: 12.8 % (ref 12.4–15.4)
PLATELET # BLD: 263 K/UL (ref 135–450)
PMV BLD AUTO: 8.1 FL (ref 5–10.5)
POTASSIUM REFLEX MAGNESIUM: 4.1 MMOL/L (ref 3.5–5.1)
RBC # BLD: 3.24 M/UL (ref 4–5.2)
SODIUM BLD-SCNC: 138 MMOL/L (ref 136–145)
WBC # BLD: 8.5 K/UL (ref 4–11)

## 2022-12-30 PROCEDURE — 85025 COMPLETE CBC W/AUTO DIFF WBC: CPT

## 2022-12-30 PROCEDURE — 2500000003 HC RX 250 WO HCPCS: Performed by: INTERNAL MEDICINE

## 2022-12-30 PROCEDURE — 6370000000 HC RX 637 (ALT 250 FOR IP): Performed by: INTERNAL MEDICINE

## 2022-12-30 PROCEDURE — 36415 COLL VENOUS BLD VENIPUNCTURE: CPT

## 2022-12-30 PROCEDURE — 2580000003 HC RX 258: Performed by: INTERNAL MEDICINE

## 2022-12-30 PROCEDURE — A4216 STERILE WATER/SALINE, 10 ML: HCPCS | Performed by: INTERNAL MEDICINE

## 2022-12-30 PROCEDURE — 93005 ELECTROCARDIOGRAM TRACING: CPT | Performed by: INTERNAL MEDICINE

## 2022-12-30 PROCEDURE — 73630 X-RAY EXAM OF FOOT: CPT

## 2022-12-30 PROCEDURE — 1200000000 HC SEMI PRIVATE

## 2022-12-30 PROCEDURE — 80048 BASIC METABOLIC PNL TOTAL CA: CPT

## 2022-12-30 RX ORDER — BUPIVACAINE HYDROCHLORIDE 5 MG/ML
INJECTION, SOLUTION EPIDURAL; INTRACAUDAL
Status: DISPENSED
Start: 2022-12-30 | End: 2022-12-31

## 2022-12-30 RX ORDER — 0.9 % SODIUM CHLORIDE 0.9 %
1000 INTRAVENOUS SOLUTION INTRAVENOUS ONCE
Status: COMPLETED | OUTPATIENT
Start: 2022-12-30 | End: 2022-12-30

## 2022-12-30 RX ORDER — DEXAMETHASONE SODIUM PHOSPHATE 4 MG/ML
INJECTION, SOLUTION INTRA-ARTICULAR; INTRALESIONAL; INTRAMUSCULAR; INTRAVENOUS; SOFT TISSUE
Status: DISPENSED
Start: 2022-12-30 | End: 2022-12-31

## 2022-12-30 RX ORDER — DEXAMETHASONE SODIUM PHOSPHATE 10 MG/ML
INJECTION, SOLUTION INTRAMUSCULAR; INTRAVENOUS
Status: DISPENSED
Start: 2022-12-30 | End: 2022-12-31

## 2022-12-30 RX ADMIN — SODIUM CHLORIDE 1000 ML: 9 INJECTION, SOLUTION INTRAVENOUS at 10:12

## 2022-12-30 RX ADMIN — MICONAZOLE NITRATE: 20 POWDER TOPICAL at 20:03

## 2022-12-30 RX ADMIN — OXYCODONE AND ACETAMINOPHEN 2 TABLET: 5; 325 TABLET ORAL at 05:21

## 2022-12-30 RX ADMIN — OXYCODONE AND ACETAMINOPHEN 1 TABLET: 5; 325 TABLET ORAL at 16:13

## 2022-12-30 RX ADMIN — MICONAZOLE NITRATE: 20 POWDER TOPICAL at 09:30

## 2022-12-30 RX ADMIN — FAMOTIDINE 20 MG: 10 INJECTION, SOLUTION INTRAVENOUS at 09:26

## 2022-12-30 RX ADMIN — FAMOTIDINE 20 MG: 10 INJECTION, SOLUTION INTRAVENOUS at 19:56

## 2022-12-30 RX ADMIN — SODIUM CHLORIDE, PRESERVATIVE FREE 10 ML: 5 INJECTION INTRAVENOUS at 20:03

## 2022-12-30 RX ADMIN — SODIUM CHLORIDE, PRESERVATIVE FREE 10 ML: 5 INJECTION INTRAVENOUS at 09:29

## 2022-12-30 RX ADMIN — LEVOTHYROXINE SODIUM 175 MCG: 0.17 TABLET ORAL at 05:19

## 2022-12-30 RX ADMIN — OXYCODONE AND ACETAMINOPHEN 2 TABLET: 5; 325 TABLET ORAL at 20:57

## 2022-12-30 RX ADMIN — MICONAZOLE NITRATE: 20 POWDER TOPICAL at 00:00

## 2022-12-30 ASSESSMENT — PAIN DESCRIPTION - LOCATION
LOCATION: ARM;FOOT
LOCATION: ARM;FOOT
LOCATION: SHOULDER
LOCATION_2: ANKLE

## 2022-12-30 ASSESSMENT — PAIN DESCRIPTION - ONSET
ONSET: ON-GOING
ONSET: ON-GOING

## 2022-12-30 ASSESSMENT — PAIN DESCRIPTION - PAIN TYPE
TYPE: ACUTE PAIN
TYPE: ACUTE PAIN

## 2022-12-30 ASSESSMENT — PAIN - FUNCTIONAL ASSESSMENT
PAIN_FUNCTIONAL_ASSESSMENT: PREVENTS OR INTERFERES SOME ACTIVE ACTIVITIES AND ADLS
PAIN_FUNCTIONAL_ASSESSMENT: PREVENTS OR INTERFERES WITH MANY ACTIVE NOT PASSIVE ACTIVITIES
PAIN_FUNCTIONAL_ASSESSMENT_SITE2: PREVENTS OR INTERFERES WITH MANY ACTIVE NOT PASSIVE ACTIVITIES

## 2022-12-30 ASSESSMENT — PAIN DESCRIPTION - INTENSITY: RATING_2: 6

## 2022-12-30 ASSESSMENT — PAIN DESCRIPTION - DESCRIPTORS
DESCRIPTORS: ACHING;DISCOMFORT
DESCRIPTORS_2: ACHING;DISCOMFORT

## 2022-12-30 ASSESSMENT — PAIN SCALES - GENERAL
PAINLEVEL_OUTOF10: 7
PAINLEVEL_OUTOF10: 7
PAINLEVEL_OUTOF10: 5
PAINLEVEL_OUTOF10: 4

## 2022-12-30 ASSESSMENT — PAIN DESCRIPTION - ORIENTATION
ORIENTATION_2: LEFT
ORIENTATION: RIGHT
ORIENTATION: MID

## 2022-12-30 ASSESSMENT — PAIN DESCRIPTION - FREQUENCY
FREQUENCY: CONTINUOUS
FREQUENCY: CONTINUOUS

## 2022-12-30 NOTE — PLAN OF CARE
Problem: Safety - Adult  Goal: Free from fall injury  Outcome: Progressing   Bed locked in lowest position. Bed alarm on. Call light/belongings within reach. Problem: Pain  Goal: Verbalizes/displays adequate comfort level or baseline comfort level  Outcome: Progressing  Pain to LLE and RUE treated with PO and IV medication. Problem: Skin/Tissue Integrity  Goal: Absence of new skin breakdown  Outcome: Progressing  Excoriation to breasts/abdominal pannus/groin treated with miconazole powder. Specialty mattress in place with alternating pressure.

## 2022-12-30 NOTE — PROGRESS NOTES
Preop- RCRI 0, 3.9 %  30-day risk of death, MI, or cardiac arrest  Low risk surgery  S/p Type and screen  INR ordered stat this am  EKG reviewed, Sinus tachycardia, which is due to volume depletion and anxiety  Will give bolus of 1 L IVF    Hospitalist

## 2022-12-30 NOTE — CARE COORDINATION
Case Management Assessment  Initial Evaluation    Date/Time of Evaluation: 12/30/2022 12:42 PM  Assessment Completed by: Tima Null RN    If patient is discharged prior to next notation, then this note serves as note for discharge by case management. Patient Name: Emilie Lundberg                   YOB: 1945  Diagnosis: Fall from standing, initial encounter [W19. XXXA]  Fx humeral neck, right, closed, initial encounter [S42.211A]  Tibia/fibula fracture, right, open type I or II, initial encounter [S82.201B, S82.401B]  Closed fracture of distal end of left fibula, unspecified fracture morphology, initial encounter [N55.565X]                   Date / Time: 12/29/2022  2:32 PM    Patient Admission Status: Inpatient   Readmission Risk (Low < 19, Mod (19-27), High > 27): Readmission Risk Score: 13.7    Current PCP: Omid De La Rosa MD  PCP verified by CM? No    Chart Reviewed: Yes      History Provided by: Patient  Patient Orientation: Alert and Oriented    Patient Cognition: Alert    Hospitalization in the last 30 days (Readmission):  No    If yes, Readmission Assessment in CM Navigator will be completed. Advance Directives:      Code Status: Full Code   Patient's Primary Decision Maker is: Legal Next of Kin      Discharge Planning:    Patient lives with: Alone Type of Home: House  Primary Care Giver: Self  Patient Support Systems include: Children   Current Financial resources: Medicare  Current community resources: None  Current services prior to admission: None            Current DME: Cane            Type of Home Care services:  None    ADLS  Prior functional level: Independent in ADLs/IADLs  Current functional level: Independent in ADLs/IADLs    PT AM-PAC:   /24  OT AM-PAC:   /24    Family can provide assistance at DC: Yes  Would you like Case Management to discuss the discharge plan with any other family members/significant others, and if so, who?  No  Plans to Return to Present Housing: Unknown at present  Other Identified Issues/Barriers to RETURNING to current housing: none  Potential Assistance needed at discharge: Lucian Meza            Potential DME:    Patient expects to discharge to: [facility] [address] for transportation at discharge: Family    Financial    Payor: MEDICARE / Plan: MEDICARE PART A AND B / Product Type: *No Product type* /     Does insurance require precert for SNF: No    Potential assistance Purchasing Medications: No  Meds-to-Beds request:        [address] [address] [id number] Constantine Abreu [phone]  [address] [place] [id number]  Phone: [phone] Fax: [phone]    [facility] [place] Tad Quezada, Markie Ghosh [address] [place] [address] [phone] - f [phone]  [place] [address]  Phone: [phone] Fax: [phone]    [facility] ([facility]) - Yvan Bar[address] [facility] [phone] - F [phone]  [address] [address]  Phone: [phone] Fax: [phone]      Notes:    Factors facilitating achievement of predicted outcomes: Family support, Cooperative, and Pleasant    Barriers to discharge: Medical complications    Additional Case Management Notes:   Patient is from home alone, independent at home, son Jorge Zhao helps often, uses a 4 point cane at home, lives in [address] home with 3 steps to enter. Patient anticipates she will need rehab at discharge. The Plan for Transition of Care is related to the following treatment goals of Fall from standing, initial encounter [W19. XXXA]  Fx humeral neck, right, closed, initial encounter [S42.211A]  Tibia/fibula fracture, right, open type I or II, initial encounter [S82.201B, S82.401B]  Closed fracture of distal end of left fibula, unspecified fracture morphology, initial encounter [I38.993Q]    IF APPLICABLE: The Patient and/or patient representative Micah Craig and her family were provided with a choice of provider and agrees with the discharge plan. Freedom of choice list with basic dialogue that supports the patient's individualized plan of care/goals and shares the quality data associated with the providers was provided to: Patient   Patient Representative Name:       The Patient and/or Patient Representative Agree with the Discharge Plan?  Yes    Valarie Hair RN  Case Management Department  Ph: [phone] Fax: [phone]

## 2022-12-30 NOTE — PLAN OF CARE
Problem: Safety - Adult  Goal: Free from fall injury  12/30/2022 0835 by Deborah Lanier RN  Outcome: Progressing     Problem: Pain  Goal: Verbalizes/displays adequate comfort level or baseline comfort level  12/30/2022 0835 by Deborah Lanier RN  Outcome: Progressing     Problem: Discharge Planning  Goal: Discharge to home or other facility with appropriate resources  Outcome: Progressing     Problem: Skin/Tissue Integrity  Goal: Absence of new skin breakdown  Description: 1. Monitor for areas of redness and/or skin breakdown  2. Assess vascular access sites hourly  3. Every 4-6 hours minimum:  Change oxygen saturation probe site  4. Every 4-6 hours:  If on nasal continuous positive airway pressure, respiratory therapy assess nares and determine need for appliance change or resting period.   12/30/2022 0835 by Deborah Lanier RN  Outcome: Progressing     Problem: ABCDS Injury Assessment  Goal: Absence of physical injury  Outcome: Progressing

## 2022-12-30 NOTE — PROGRESS NOTES
ORTHO   Full note to follow  Minimally displaced L ankle lateral malleolus fracture  Displaced comminuted R proximal humerus fracture    Plan nonop treatment L ankle, WBAT in boot  Plan for R reverse total shoulder, CT pending

## 2022-12-30 NOTE — ED NOTES
SBAR report given to [facility] RN   Questions answered to [id number] Pallavi RN  12/29/22 2020

## 2022-12-30 NOTE — PROGRESS NOTES
4 Eyes Admission Assessment     I agree as the admission nurse that 2 RN's have performed a thorough Head to Toe Skin Assessment on the patient. ALL assessment sites listed below have been assessed on admission. Areas assessed by both nurses: Bernice Barriga RN    [x]   Head, Face, and Ears- L eye bruising, laceration   [x]   Shoulders, Back, and Chest  [x]   Arms, Elbows, and Hands- L arm laceration, R arm swelling, redness    [x]   Coccyx, Sacrum, and Ischium- redness  [x]   Legs, Feet, and Heels- scattered abrasions, redness     Excoriation to breasts, abdominal pannus, and groin       Does the Patient have Skin Breakdown?   No         Carlos Alberto Prevention initiated:  Yes   Wound Care Orders initiated:  No      [facility] nurse consulted for Pressure Injury (Stage 3,4, Unstageable, DTI, NWPT, and Complex wounds) or Carlos Alberto score 18 or lower:  No      Nurse 1 eSignature: Electronically signed by Sarika Weber RN on 12/29/22 at 10:03 PM EST    **SHARE this note so that the co-signing nurse is able to place an eSignature**    Nurse 2 eSignature: Electronically signed by Herman Hurd RN on 12/29/22 at 10:34 PM EST

## 2022-12-30 NOTE — PROGRESS NOTES
ORTHO  R comminuted displaced proximal humerus fracture - reverse total shoulder later today  L ankle fx - tall cast boot, WBAT  Will obtain L foot xrays, h/o pins s/p prior bunion surgery with MTP pain  Abx OCTOR  SW consult - rehab vs ECF post op

## 2022-12-30 NOTE — PROGRESS NOTES
Pt A&O, VSS. Pt complains of pain which is being managed with prn medication given per mar and repositioning. Weakness and limited movement noted in R arm and L leg. All fall and safety precautions in place, bed locked and in lowest position, call light and belongings within reach, pt denies further needs at this time.

## 2022-12-30 NOTE — H&P
Hospital Medicine History & Physical      PCP: Filippo Douglas MD    Date of Admission: 12/29/2022    Date of Service: Pt seen/examined on 12/30/22 and Admitted to Inpatient with expected LOS greater than two midnights due to medical therapy. Chief Complaint:  fall while getting her mail out      History Of Present Illness:     69 y.o. female Shauna Ruffin  -History of hypertension B12 deficiency hypothyroidism  -She went out to get her mail, slipped and hit her left side of the head on the mailbox and right hip on the stairs  -States her neighbor saw her and tried to get her up or unable to and EMS was called she was brought to the emergency room. -In the ED initial vitals were stable, labs showed BUN of 21 anion gap 17 WBC of 20.3 with left shift. UA was unremarkable although specific gravity greater than 1.030. Multiple imaging done included multiple imagings were done significant for comminuted oblique fracture distal fibula and lateral malleolus extending into the syndesmosis of the ankle without fracture of the posterior or medial malleolus. There was diffuse soft tissue swelling noted. Right shoulder impacted fracture right humeral neck and humeral head. Jen Blank was consulted, recommended reverse total shoulder, left ankle fracture would need to be in cast boot and weightbearing as tolerated. Patient to be admitted for further management    Past Medical History:          Diagnosis Date    Arthritis     Blood transfusion age 7    Hyperlipidemia     Hypertension     Hypothyroid     Rheumatic fever 1955       Past Surgical History:          Procedure Laterality Date    Voldi 78       Medications Prior to Admission:      Prior to Admission medications    Medication Sig Start Date End Date Taking? Authorizing Provider   ketoconazole (NIZORAL) 2 % cream Apply topically daily.  1/27/15   Filippo Douglas MD   meloxicam (MOBIC) 15 MG tablet TAKE ONE TABLET BY MOUTH EVERY DAY 12/1/14   Pee Reed MD   levothyroxine (SYNTHROID) 175 MCG tablet Take 1 tablet by mouth  daily. Take additional 1/2  tablet by mouth on Monday  and Wednesday 6/23/14   Pee Reed MD   lisinopril-hydrochlorothiazide (ZESTORETIC) 20-25 MG per tablet TAKE ONE TABLET BY MOUTH EVERY DAY 6/12/14   Pee Reed MD   Oral Medication Containers (MEDICATION CONTAINER/SMALL) MISC Take 5 mLs by mouth 4 times daily as needed. Miles Mixture:  80 cc Viscous Lidocaine, 20 mg Hydrocortisone, 100 mg Doxycycline, 2 million units Nystatin, qs up to 180 cc with Benadryl elixir (cherry or grape) 4/3/14   Pee Reed MD   potassium chloride (KLOR-CON) 8 MEQ tablet Take 1 tablet by mouth daily. 12/5/13   Pee Reed MD   lisinopril-hydrochlorothiazide (PRINZIDE;ZESTORETIC) 20-25 MG per tablet Take 0.5 tablets by mouth daily.      Historical MD Lashae   Cholecalciferol (VITAMIN D) 2000 UNITS TABS Take 1 tablet by mouth daily. 8/13/12   Pee Reed MD   cyanocobalamin 1000 MCG/ML injection Inject 1 mL into the skin once for 1 dose. 8/11/12 8/11/12  Susan Oconnor MD   docusate sodium 100 MG CAPS Take 100 mg by mouth daily. 8/11/12   Susan Oconnor MD   ferrous sulfate 325 (65 FE) MG EC tablet Take 1 tablet by mouth 2 times daily (with meals). 8/11/12   Susan Oconnor MD   multivitamin (THERAGRAN) per tablet Take 1 tablet by mouth daily. 8/11/12   Susan Oconnor MD       Allergies:  Patient has no known allergies.    Social History:      The patient currently lives at home alone     TOBACCO:   reports that she quit smoking about 10 years ago. She has never used smokeless tobacco.  ETOH:   reports no history of alcohol use.      Family History:    reviewed        Problem Relation Age of Onset    Breast Cancer Sister     Cancer Sister         breast    Hypertension Mother     Diabetes Mother        REVIEW OF SYSTEMS:   Pertinent positives as noted in the HPI. All other systems reviewed and negative.    PHYSICAL EXAM  PERFORMED:    /62   Pulse (!) 126   Temp 98.8 °F (37.1 °C) (Oral)   Resp 20   Ht 5' (1.524 m)   Wt 300 lb (136.1 kg)   SpO2 92%   BMI 58.59 kg/m²     General appearance: Mild to moderate distress due to pain  HEENT:  Normal cephalic, atraumatic without obvious deformity. Pupils equal, round, and reactive to light. Extra ocular muscles intact. Conjunctivae/corneas clear. Dry mucous membranes  Neck: Supple, with full range of motion. No jugular venous distention. Trachea midline. Respiratory:  Normal respiratory effort. Clear to auscultation, bilaterally without Rales/Wheezes/Rhonchi. Cardiovascular:  Regular rate and rhythm with normal S1/S2 without murmurs, rubs or gallops. Abdomen: Soft, non-tender, non-distended with normal bowel sounds. Musculoskeletal:    Swelling and tenderness of the left ankle  Right shoulder decreased range of motion unable to lift her Rt UE\  Skin: Dry skin  Neurologic:  Neurovascularly intact without any focal sensory/motor deficits. Cranial nerves: II-XII intact, grossly non-focal.  Psychiatric:  Alert and oriented, thought content appropriate, normal insight  Capillary Refill: Brisk,< 3 seconds   Peripheral Pulses: +2 palpable, equal bilaterally       Labs:     Recent Labs     12/29/22  1731   WBC 20.3*   HGB 12.5   HCT 36.4        Recent Labs     12/29/22  1731      K 3.8      CO2 21   BUN 21*   CREATININE 0.9   CALCIUM 9.7     No results for input(s): AST, ALT, BILIDIR, BILITOT, ALKPHOS in the last 72 hours. No results for input(s): INR in the last 72 hours. No results for input(s): Kathryn Horn in the last 72 hours.     Urinalysis:      Lab Results   Component Value Date/Time    NITRU Negative 12/29/2022 05:31 PM    BLOODU Negative 12/29/2022 05:31 PM    SPECGRAV >=1.030 12/29/2022 05:31 PM    GLUCOSEU Negative 12/29/2022 05:31 PM       Radiology:     CXR: I have reviewed the CXR with the following interpretation: Cardiomegaly but overall no consolidation effusion or pneumothorax  EKG:  I have reviewed the EKG with the following interpretation: Ordered    CT CERVICAL SPINE WO CONTRAST   Final Result      No fracture seen. Moderately advanced multilevel spondylotic change. CT HEAD WO CONTRAST   Final Result      No evidence of acute intracranial abnormality. XR CHEST PORTABLE   Final Result      Cardiomegaly. No acute pulmonary abnormality. XR HUMERUS RIGHT (MIN 2 VIEWS)   Final Result      Acute impacted fracture of the right humeral neck and humeral head         2 VIEWS OF THE LEFT HUMERUS on 12/29/2022      HISTORY: Pain and trauma. PROCEDURE:      AP and lateral  views were obtained. FINDINGS:      There is no sign of a discrete fracture-dislocation or joint effusion. There is no bony defect identified or loose body. There is no radiopaque foreign body      The AC joint and glenohumeral joint reveal degenerative changes      IMPRESSION:      No sign of any fracture or dislocation. Chronic degenerative changes of the glenohumeral and AC joint. TWO VIEWS OF THE  RIGHT SHOULDER on 12/29/2022      HISTORY: shoulder pain. PROCEDURE:      AP internal/external rotation views were obtained. FINDINGS:      There is impacted right proximal humeral neck oblique fracture and comminuted humeral head fracture with multiple fragments and lateral positioning of the distal humerus in relation humeral head. There is AC joint hypertrophy. No sign of any dislocation. IMPRESSION:      Comminuted and impacted right humeral neck and head fracture, without gross dislocation      LEFT TIBIA/FIBULA 2 VIEWS on 12/29/2022      HISTORY: Pain. Fell      PROCEDURE:      AP and lateral views were obtained, including the knee and ankle joints. FINDINGS:      There is normal alignment of the proximal and mid tibia and fibula. There is distal oblique fibular and lateral malleolus fracture.  The knee is severely arthritic with severe medial lateral compartment narrowing and condylar spurring. There is no    radiopaque foreign body. IMPRESSION:      Normal radiographs of the proximal left tibia and fibula. Advanced arthritic changes noted at the knee with severe medial and lateral compartment narrowing condylar spurring and demineralization. . Distal left fibular fracture is noted extending in the    lateral malleolus         XR HUMERUS LEFT (MIN 2 VIEWS)   Final Result      Acute impacted fracture of the right humeral neck and humeral head         2 VIEWS OF THE LEFT HUMERUS on 12/29/2022      HISTORY: Pain and trauma. PROCEDURE:      AP and lateral  views were obtained. FINDINGS:      There is no sign of a discrete fracture-dislocation or joint effusion. There is no bony defect identified or loose body. There is no radiopaque foreign body      The AC joint and glenohumeral joint reveal degenerative changes      IMPRESSION:      No sign of any fracture or dislocation. Chronic degenerative changes of the glenohumeral and AC joint. TWO VIEWS OF THE  RIGHT SHOULDER on 12/29/2022      HISTORY: shoulder pain. PROCEDURE:      AP internal/external rotation views were obtained. FINDINGS:      There is impacted right proximal humeral neck oblique fracture and comminuted humeral head fracture with multiple fragments and lateral positioning of the distal humerus in relation humeral head. There is AC joint hypertrophy. No sign of any dislocation. IMPRESSION:      Comminuted and impacted right humeral neck and head fracture, without gross dislocation      LEFT TIBIA/FIBULA 2 VIEWS on 12/29/2022      HISTORY: Pain. Fell      PROCEDURE:      AP and lateral views were obtained, including the knee and ankle joints. FINDINGS:      There is normal alignment of the proximal and mid tibia and fibula. There is distal oblique fibular and lateral malleolus fracture.  The knee is severely arthritic with severe medial lateral compartment narrowing and condylar spurring. There is no    radiopaque foreign body. IMPRESSION:      Normal radiographs of the proximal left tibia and fibula. Advanced arthritic changes noted at the knee with severe medial and lateral compartment narrowing condylar spurring and demineralization. . Distal left fibular fracture is noted extending in the    lateral malleolus         XR SHOULDER RIGHT (MIN 2 VIEWS)   Final Result      Acute impacted fracture of the right humeral neck and humeral head         2 VIEWS OF THE LEFT HUMERUS on 12/29/2022      HISTORY: Pain and trauma. PROCEDURE:      AP and lateral  views were obtained. FINDINGS:      There is no sign of a discrete fracture-dislocation or joint effusion. There is no bony defect identified or loose body. There is no radiopaque foreign body      The AC joint and glenohumeral joint reveal degenerative changes      IMPRESSION:      No sign of any fracture or dislocation. Chronic degenerative changes of the glenohumeral and AC joint. TWO VIEWS OF THE  RIGHT SHOULDER on 12/29/2022      HISTORY: shoulder pain. PROCEDURE:      AP internal/external rotation views were obtained. FINDINGS:      There is impacted right proximal humeral neck oblique fracture and comminuted humeral head fracture with multiple fragments and lateral positioning of the distal humerus in relation humeral head. There is AC joint hypertrophy. No sign of any dislocation. IMPRESSION:      Comminuted and impacted right humeral neck and head fracture, without gross dislocation      LEFT TIBIA/FIBULA 2 VIEWS on 12/29/2022      HISTORY: Pain. Fell      PROCEDURE:      AP and lateral views were obtained, including the knee and ankle joints. FINDINGS:      There is normal alignment of the proximal and mid tibia and fibula. There is distal oblique fibular and lateral malleolus fracture.  The knee is severely arthritic with severe medial lateral compartment narrowing and condylar spurring. There is no    radiopaque foreign body. IMPRESSION:      Normal radiographs of the proximal left tibia and fibula. Advanced arthritic changes noted at the knee with severe medial and lateral compartment narrowing condylar spurring and demineralization. . Distal left fibular fracture is noted extending in the    lateral malleolus         XR ANKLE LEFT (MIN 3 VIEWS)   Final Result      Comminuted oblique fracture of the distal fibula and lateral malleolus extending into the syndesmosis of the ankle without discrete fracture of the posterior or medial malleolus at this time. Diffuse soft tissue swelling noted         XR TIBIA FIBULA LEFT (2 VIEWS)   Final Result      Acute impacted fracture of the right humeral neck and humeral head         2 VIEWS OF THE LEFT HUMERUS on 12/29/2022      HISTORY: Pain and trauma. PROCEDURE:      AP and lateral  views were obtained. FINDINGS:      There is no sign of a discrete fracture-dislocation or joint effusion. There is no bony defect identified or loose body. There is no radiopaque foreign body      The AC joint and glenohumeral joint reveal degenerative changes      IMPRESSION:      No sign of any fracture or dislocation. Chronic degenerative changes of the glenohumeral and AC joint. TWO VIEWS OF THE  RIGHT SHOULDER on 12/29/2022      HISTORY: shoulder pain. PROCEDURE:      AP internal/external rotation views were obtained. FINDINGS:      There is impacted right proximal humeral neck oblique fracture and comminuted humeral head fracture with multiple fragments and lateral positioning of the distal humerus in relation humeral head. There is AC joint hypertrophy. No sign of any dislocation. IMPRESSION:      Comminuted and impacted right humeral neck and head fracture, without gross dislocation      LEFT TIBIA/FIBULA 2 VIEWS on 12/29/2022      HISTORY: Pain.  Fell      PROCEDURE:      AP and lateral views were obtained, including the knee and ankle joints. FINDINGS:      There is normal alignment of the proximal and mid tibia and fibula. There is distal oblique fibular and lateral malleolus fracture. The knee is severely arthritic with severe medial lateral compartment narrowing and condylar spurring. There is no    radiopaque foreign body. IMPRESSION:      Normal radiographs of the proximal left tibia and fibula. Advanced arthritic changes noted at the knee with severe medial and lateral compartment narrowing condylar spurring and demineralization. . Distal left fibular fracture is noted extending in the    lateral malleolus             ASSESSMENT:    Active Hospital Problems    Diagnosis Date Noted    Tibia/fibula fracture, right, open type I or II, initial encounter [S82.201B, S82.401B] 12/29/2022     Priority: Medium    Sinus tachycardia [R00.0] 12/29/2022     Priority: Medium    Closed fracture of neck of right humerus [S42.211A] 12/29/2022     Priority: Medium    Morbid obesity ([facility] [address] [E66.01] 12/29/2022     Priority: Medium     Comminuted oblique fracture distal fibula and lateral malleolus extending into the syndesmosis of the ankle without fracture of the posterior or medial malleolus. There was diffuse soft tissue swelling noted. -Pain medication, anti-inflammatory medications, weightbearing is as tolerated per Ortho    Right shoulder impacted fracture right humeral neck and humeral head. Ulices Ware was consulted, recommended reverse total shoulder, left ankle fracture would need to be in cast boot and weightbearing as tolerated.   S/p type and screen, check INR  cardiomegaly on x-ray, will get an EKG    Leukocytosis, likely reactive, no signs of infection, UA is clear, sinus tachycardia likely due to anxiety and volume depletion    Sinus tachycardia, Patient had Fuentes catheter placed in the emergency room, after placement to was noted to be tachycardic heart rate 130s to 140s, telemetry was reviewed and appeared sinus tachycardia. Overall patient did look volume dry with increased respiratory effort as well as dry mucous membranes of was given IV fluid boluses    Morbid obesity Body mass index is 57.69 kg/m². - Complicating assessment and treatment. Placing patient at risk for multiple co-morbidities as well as early death and contributing to the patient's presentation.  on weight loss when appropriate. DVT Prophylaxis: SCDs  Diet: ADULT DIET; Regular  Diet NPO  Code Status: Full Code    PT/OT Eval Status: order after surgical intervention, weightbearing directions per orthopedic surgery    Dispo - Admit as inpatient. I anticipate hospitalization spanning more than two midnights for investigation and treatment of the above medically necessary diagnoses. Mckay Kingston MD  Internal Medicine Hospitalist    Thank you Joel Tripp MD for the opportunity to be involved in this patient's care. If you have any questions or concerns please feel free to contact me at [phone].

## 2022-12-30 NOTE — CONSULTS
Department of Orthopedic Surgery  Attending Consult Note        Reason for Consult:  R shoulder, L ankle, L foot pain    CHIEF COMPLAINT: R shoulder, L ankle, L foot pain    History Obtained From:  patient    HISTORY OF PRESENT ILLNESS:                The patient is a 69 y.o. female who is being seen in consultation for evaluation of R shoulder pain, L ankle and foot pain. H/o remote bunionectomy, pinning L great toe. Had mechanical fall yesterday, tripped on rocks. Lives alone, . Difficulty bearing weight L LE, R UE. No n/t. Denies other acute neck back or extremity c/o. No reported LOC, head trauma, CP, SOB. Pain with motion R shoulder in particular.   Better at rest    Past Medical History:        Diagnosis Date    Arthritis     Blood transfusion age 7    Hyperlipidemia     Hypertension     Hypothyroid     Rheumatic fever 1955     Past Surgical History:        Procedure Laterality Date    BUNIONECTOMY  1993    CHOLECYSTECTOMY  1997     Current Medications:   Current Facility-Administered Medications: bupivacaine (PF) (MARCAINE) 0.5 % injection, , ,   dexamethasone (DECADRON) 4 MG/ML injection, , ,   dexamethasone (PF) (DECADRON) 10 MG/ML injection, , ,   levothyroxine (SYNTHROID) tablet 175 mcg, 175 mcg, Oral, Daily  sodium chloride flush 0.9 % injection 5-40 mL, 5-40 mL, IntraVENous, 2 times per day  sodium chloride flush 0.9 % injection 5-40 mL, 5-40 mL, IntraVENous, PRN  0.9 % sodium chloride infusion, , IntraVENous, PRN  ondansetron (ZOFRAN-ODT) disintegrating tablet 4 mg, 4 mg, Oral, Q8H PRN **OR** ondansetron (ZOFRAN) injection 4 mg, 4 mg, IntraVENous, Q6H PRN  polyethylene glycol (GLYCOLAX) packet 17 g, 17 g, Oral, Daily PRN  acetaminophen (TYLENOL) tablet 650 mg, 650 mg, Oral, Q6H PRN **OR** acetaminophen (TYLENOL) suppository 650 mg, 650 mg, Rectal, Q6H PRN  famotidine (PEPCID) 20 mg in sodium chloride (PF) 0.9 % 10 mL injection, 20 mg, IntraVENous, BID  morphine (PF) injection 2 mg, 2 mg, IntraVENous, Q2H PRN **OR** morphine (PF) injection 4 mg, 4 mg, IntraVENous, Q2H PRN  oxyCODONE-acetaminophen (PERCOCET) 5-325 MG per tablet 1 tablet, 1 tablet, Oral, Q4H PRN **OR** oxyCODONE-acetaminophen (PERCOCET) 5-325 MG per tablet 2 tablet, 2 tablet, Oral, Q4H PRN  miconazole (MICOTIN) 2 % powder, , Topical, BID  Allergies:  Patient has no known allergies. Social History:   Lives alone    Family History:       Problem Relation Age of Onset    Breast Cancer Sister     Cancer Sister         breast    Hypertension Mother     Diabetes Mother      REVIEW OF SYSTEMS:    No systemic c/o  All other systems acutely negative except as per hpi    PHYSICAL EXAM:    VITALS:  /62   Pulse (!) 104   Temp 98.5 °F (36.9 °C) (Oral)   Resp 16   Ht 5' (1.524 m)   Wt 295 lb 6.7 oz (134 kg)   SpO2 96%   BMI 57.69 kg/m²   CONSTITUTIONAL:  awake, alert, cooperative, no apparent distress, and appears stated age  EYES:  Lids and lashes normal, pupils equal, ecchymosis L face/eye region  ENT:  Normocephalic, without obvious abnormality,  NECK:  Supple, symmetrical, trachea midline  HEMATOLOGIC/LYMPHATICS:  no cervical lymphadenopathy  BACK:  Symmetric, no curvature, spinous processes are non-tender on palpation  LUNGS:  No increased work of breathing, good air exchange  MUSCULOSKELETAL:  R shoulder +swelling, tenderness. No elbow/wrist tenderness B UE.  L shoulder good ROM without pain.  -log roll B LE.  L ankle tender laterally and at great toe.   healed incision L great toe.  +ecchymosis  NEUROLOGIC: no acute focal deficits  SKIN:  ecchymosis noted face, R shoulder  Gait: unable  Coordination: intact    DATA:    CBC:   Lab Results   Component Value Date/Time    WBC 8.5 12/30/2022 06:49 AM    RBC 3.24 12/30/2022 06:49 AM    HGB 10.6 12/30/2022 06:49 AM    HCT 31.0 12/30/2022 06:49 AM    MCV 95.6 12/30/2022 06:49 AM    MCH 32.6 12/30/2022 06:49 AM    MCHC 34.1 12/30/2022 06:49 AM    RDW 12.8 12/30/2022 06:49 AM  12/30/2022 06:49 AM    MPV 8.1 12/30/2022 06:49 AM     CMP:    Lab Results   Component Value Date/Time     12/30/2022 06:49 AM    K 4.1 12/30/2022 06:49 AM     12/30/2022 06:49 AM    CO2 23 12/30/2022 06:49 AM    BUN 19 12/30/2022 06:49 AM    CREATININE 1.0 12/30/2022 06:49 AM    GFRAA >60 04/22/2015 10:26 PM    GFRAA >60 06/04/2013 10:04 AM    LABGLOM 58 12/30/2022 06:49 AM    GLUCOSE 118 12/30/2022 06:49 AM    PROT 7.4 09/09/2014 09:20 AM    PROT 7.8 12/17/2012 09:48 AM    LABALBU 4.3 09/09/2014 09:20 AM    CALCIUM 9.6 12/30/2022 06:49 AM    BILITOT 0.3 09/09/2014 09:20 AM    ALKPHOS 64 09/09/2014 09:20 AM    AST 18 09/09/2014 09:20 AM    ALT 7 09/09/2014 09:20 AM     PT/INR:  No results found for: PROTIME, INR  PTT:  No results found for: APTT, PTT[APTT}    IMPRESSION/RECOMMENDATIONS:  L ankle lateral malleolus fracture  L 1st toe proximal phalanx fracture  R displaced comminuted proximal humerus fracture    Personally reviewed and interpreted Xrays R shoulder, L ankle, L foot and CT R shoulder  Displaced comminuted R proximal humerus fracture, risks/complications, benefits of treatment discussed.   Plan for R reverse total shoulder arthroplasty  Abx OCTOR  SW consult  Will have fitted with L cast boot for ankle fx, L great toe fracture, WBAT

## 2022-12-30 NOTE — PROGRESS NOTES
Patient a/o x4. VSS. Tachycardic on monitor. Pain to RUE and LLE treated with PO medication. Fuentes in place. Bedrest overnight. NPO at midnight.

## 2022-12-31 ENCOUNTER — ANESTHESIA (OUTPATIENT)
Dept: OPERATING ROOM | Age: 77
End: 2022-12-31
Payer: MEDICARE

## 2022-12-31 ENCOUNTER — APPOINTMENT (OUTPATIENT)
Dept: GENERAL RADIOLOGY | Age: 77
DRG: 483 | End: 2022-12-31
Payer: MEDICARE

## 2022-12-31 LAB
EKG ATRIAL RATE: 107 BPM
EKG DIAGNOSIS: NORMAL
EKG P AXIS: 56 DEGREES
EKG P-R INTERVAL: 180 MS
EKG Q-T INTERVAL: 366 MS
EKG QRS DURATION: 98 MS
EKG QTC CALCULATION (BAZETT): 488 MS
EKG R AXIS: 69 DEGREES
EKG T AXIS: 66 DEGREES
EKG VENTRICULAR RATE: 107 BPM

## 2022-12-31 PROCEDURE — 2700000000 HC OXYGEN THERAPY PER DAY

## 2022-12-31 PROCEDURE — 2580000003 HC RX 258: Performed by: ORTHOPAEDIC SURGERY

## 2022-12-31 PROCEDURE — 7100000000 HC PACU RECOVERY - FIRST 15 MIN: Performed by: ORTHOPAEDIC SURGERY

## 2022-12-31 PROCEDURE — 6360000002 HC RX W HCPCS: Performed by: INTERNAL MEDICINE

## 2022-12-31 PROCEDURE — 1200000000 HC SEMI PRIVATE

## 2022-12-31 PROCEDURE — A4216 STERILE WATER/SALINE, 10 ML: HCPCS | Performed by: INTERNAL MEDICINE

## 2022-12-31 PROCEDURE — 93010 ELECTROCARDIOGRAM REPORT: CPT | Performed by: INTERNAL MEDICINE

## 2022-12-31 PROCEDURE — 2500000003 HC RX 250 WO HCPCS: Performed by: INTERNAL MEDICINE

## 2022-12-31 PROCEDURE — 64415 NJX AA&/STRD BRCH PLXS IMG: CPT | Performed by: ANESTHESIOLOGY

## 2022-12-31 PROCEDURE — 3600000004 HC SURGERY LEVEL 4 BASE: Performed by: ORTHOPAEDIC SURGERY

## 2022-12-31 PROCEDURE — 3700000000 HC ANESTHESIA ATTENDED CARE: Performed by: ORTHOPAEDIC SURGERY

## 2022-12-31 PROCEDURE — 2500000003 HC RX 250 WO HCPCS: Performed by: ANESTHESIOLOGY

## 2022-12-31 PROCEDURE — 94150 VITAL CAPACITY TEST: CPT

## 2022-12-31 PROCEDURE — A4217 STERILE WATER/SALINE, 500 ML: HCPCS | Performed by: ORTHOPAEDIC SURGERY

## 2022-12-31 PROCEDURE — 2720000010 HC SURG SUPPLY STERILE: Performed by: ORTHOPAEDIC SURGERY

## 2022-12-31 PROCEDURE — 3700000001 HC ADD 15 MINUTES (ANESTHESIA): Performed by: ORTHOPAEDIC SURGERY

## 2022-12-31 PROCEDURE — 7100000001 HC PACU RECOVERY - ADDTL 15 MIN: Performed by: ORTHOPAEDIC SURGERY

## 2022-12-31 PROCEDURE — 0RRJ00Z REPLACEMENT OF RIGHT SHOULDER JOINT WITH REVERSE BALL AND SOCKET SYNTHETIC SUBSTITUTE, OPEN APPROACH: ICD-10-PCS | Performed by: ORTHOPAEDIC SURGERY

## 2022-12-31 PROCEDURE — 2580000003 HC RX 258: Performed by: INTERNAL MEDICINE

## 2022-12-31 PROCEDURE — 94761 N-INVAS EAR/PLS OXIMETRY MLT: CPT

## 2022-12-31 PROCEDURE — 6370000000 HC RX 637 (ALT 250 FOR IP): Performed by: ANESTHESIOLOGY

## 2022-12-31 PROCEDURE — 2580000003 HC RX 258: Performed by: ANESTHESIOLOGY

## 2022-12-31 PROCEDURE — 2709999900 HC NON-CHARGEABLE SUPPLY: Performed by: ORTHOPAEDIC SURGERY

## 2022-12-31 PROCEDURE — C1713 ANCHOR/SCREW BN/BN,TIS/BN: HCPCS | Performed by: ORTHOPAEDIC SURGERY

## 2022-12-31 PROCEDURE — 3600000014 HC SURGERY LEVEL 4 ADDTL 15MIN: Performed by: ORTHOPAEDIC SURGERY

## 2022-12-31 PROCEDURE — 6360000002 HC RX W HCPCS: Performed by: ANESTHESIOLOGY

## 2022-12-31 PROCEDURE — 6360000002 HC RX W HCPCS: Performed by: ORTHOPAEDIC SURGERY

## 2022-12-31 PROCEDURE — 6370000000 HC RX 637 (ALT 250 FOR IP): Performed by: INTERNAL MEDICINE

## 2022-12-31 PROCEDURE — 73020 X-RAY EXAM OF SHOULDER: CPT

## 2022-12-31 DEVICE — KIT BNE SCR TORQ DEFINING SHLDR PRI FIX ANG REV EQUINOXE: Type: IMPLANTABLE DEVICE | Site: SHOULDER | Status: FUNCTIONAL

## 2022-12-31 DEVICE — LINER HUM DIA42MM +0MM OFFSET STD SHLDR PRI RVS EQUINOXE: Type: IMPLANTABLE DEVICE | Site: SHOULDER | Status: FUNCTIONAL

## 2022-12-31 DEVICE — IMPLANTABLE DEVICE: Type: IMPLANTABLE DEVICE | Site: SHOULDER | Status: FUNCTIONAL

## 2022-12-31 DEVICE — KIT BNE SCR L26MM DIA4.5MM GLEN SHLDR ORNG COMPR LOK CAP: Type: IMPLANTABLE DEVICE | Site: SHOULDER | Status: FUNCTIONAL

## 2022-12-31 DEVICE — CEMENT BNE 20ML 40GM FULL DOSE PMMA W/O ANTIBIO M VISC: Type: IMPLANTABLE DEVICE | Site: SHOULDER | Status: FUNCTIONAL

## 2022-12-31 DEVICE — SCREW BNE GLENOSPHERE SHLDR PRI LOK REV EQUINOXE: Type: IMPLANTABLE DEVICE | Site: SHOULDER | Status: FUNCTIONAL

## 2022-12-31 DEVICE — KIT BONE SCR L18MM DIA4.5MM GLEN SHLDR WHT COMPR LCK CAP RVS: Type: IMPLANTABLE DEVICE | Site: SHOULDER | Status: FUNCTIONAL

## 2022-12-31 RX ORDER — HYDRALAZINE HYDROCHLORIDE 20 MG/ML
10 INJECTION INTRAMUSCULAR; INTRAVENOUS
Status: DISCONTINUED | OUTPATIENT
Start: 2022-12-31 | End: 2022-12-31 | Stop reason: HOSPADM

## 2022-12-31 RX ORDER — ACETAMINOPHEN 325 MG/1
650 TABLET ORAL EVERY 6 HOURS
Status: DISCONTINUED | OUTPATIENT
Start: 2022-12-31 | End: 2023-01-03 | Stop reason: HOSPADM

## 2022-12-31 RX ORDER — LABETALOL HYDROCHLORIDE 5 MG/ML
10 INJECTION, SOLUTION INTRAVENOUS
Status: DISCONTINUED | OUTPATIENT
Start: 2022-12-31 | End: 2022-12-31 | Stop reason: HOSPADM

## 2022-12-31 RX ORDER — OXYCODONE HYDROCHLORIDE 5 MG/1
10 TABLET ORAL PRN
Status: DISCONTINUED | OUTPATIENT
Start: 2022-12-31 | End: 2022-12-31 | Stop reason: HOSPADM

## 2022-12-31 RX ORDER — OXYCODONE HYDROCHLORIDE 5 MG/1
5 TABLET ORAL PRN
Status: DISCONTINUED | OUTPATIENT
Start: 2022-12-31 | End: 2022-12-31 | Stop reason: HOSPADM

## 2022-12-31 RX ORDER — SODIUM CHLORIDE 9 MG/ML
INJECTION, SOLUTION INTRAVENOUS PRN
Status: DISCONTINUED | OUTPATIENT
Start: 2022-12-31 | End: 2023-01-03 | Stop reason: HOSPADM

## 2022-12-31 RX ORDER — CEFAZOLIN SODIUM 1 G/3ML
INJECTION, POWDER, FOR SOLUTION INTRAMUSCULAR; INTRAVENOUS PRN
Status: DISCONTINUED | OUTPATIENT
Start: 2022-12-31 | End: 2022-12-31 | Stop reason: SDUPTHER

## 2022-12-31 RX ORDER — MAGNESIUM HYDROXIDE 1200 MG/15ML
LIQUID ORAL CONTINUOUS PRN
Status: DISCONTINUED | OUTPATIENT
Start: 2022-12-31 | End: 2022-12-31 | Stop reason: HOSPADM

## 2022-12-31 RX ORDER — BUPIVACAINE HYDROCHLORIDE 5 MG/ML
INJECTION, SOLUTION EPIDURAL; INTRACAUDAL PRN
Status: DISCONTINUED | OUTPATIENT
Start: 2022-12-31 | End: 2022-12-31 | Stop reason: SDUPTHER

## 2022-12-31 RX ORDER — SODIUM CHLORIDE 9 MG/ML
INJECTION, SOLUTION INTRAVENOUS PRN
Status: DISCONTINUED | OUTPATIENT
Start: 2022-12-31 | End: 2022-12-31 | Stop reason: HOSPADM

## 2022-12-31 RX ORDER — PROPOFOL 10 MG/ML
INJECTION, EMULSION INTRAVENOUS PRN
Status: DISCONTINUED | OUTPATIENT
Start: 2022-12-31 | End: 2022-12-31 | Stop reason: SDUPTHER

## 2022-12-31 RX ORDER — ROCURONIUM BROMIDE 10 MG/ML
INJECTION, SOLUTION INTRAVENOUS PRN
Status: DISCONTINUED | OUTPATIENT
Start: 2022-12-31 | End: 2022-12-31 | Stop reason: SDUPTHER

## 2022-12-31 RX ORDER — FENTANYL CITRATE 50 UG/ML
INJECTION, SOLUTION INTRAMUSCULAR; INTRAVENOUS PRN
Status: DISCONTINUED | OUTPATIENT
Start: 2022-12-31 | End: 2022-12-31 | Stop reason: SDUPTHER

## 2022-12-31 RX ORDER — LORAZEPAM 2 MG/ML
0.5 CONCENTRATE ORAL ONCE
Status: COMPLETED | OUTPATIENT
Start: 2022-12-31 | End: 2022-12-31

## 2022-12-31 RX ORDER — DEXAMETHASONE SODIUM PHOSPHATE 4 MG/ML
INJECTION, SOLUTION INTRA-ARTICULAR; INTRALESIONAL; INTRAMUSCULAR; INTRAVENOUS; SOFT TISSUE PRN
Status: DISCONTINUED | OUTPATIENT
Start: 2022-12-31 | End: 2022-12-31 | Stop reason: SDUPTHER

## 2022-12-31 RX ORDER — SODIUM CHLORIDE 0.9 % (FLUSH) 0.9 %
5-40 SYRINGE (ML) INJECTION PRN
Status: DISCONTINUED | OUTPATIENT
Start: 2022-12-31 | End: 2023-01-03 | Stop reason: HOSPADM

## 2022-12-31 RX ORDER — FENTANYL CITRATE 50 UG/ML
25 INJECTION, SOLUTION INTRAMUSCULAR; INTRAVENOUS EVERY 5 MIN PRN
Status: DISCONTINUED | OUTPATIENT
Start: 2022-12-31 | End: 2022-12-31 | Stop reason: HOSPADM

## 2022-12-31 RX ORDER — ONDANSETRON 2 MG/ML
4 INJECTION INTRAMUSCULAR; INTRAVENOUS
Status: DISCONTINUED | OUTPATIENT
Start: 2022-12-31 | End: 2022-12-31 | Stop reason: HOSPADM

## 2022-12-31 RX ORDER — SODIUM CHLORIDE 0.9 % (FLUSH) 0.9 %
5-40 SYRINGE (ML) INJECTION EVERY 12 HOURS SCHEDULED
Status: DISCONTINUED | OUTPATIENT
Start: 2022-12-31 | End: 2022-12-31 | Stop reason: HOSPADM

## 2022-12-31 RX ORDER — SODIUM CHLORIDE 0.9 % (FLUSH) 0.9 %
5-40 SYRINGE (ML) INJECTION EVERY 12 HOURS SCHEDULED
Status: DISCONTINUED | OUTPATIENT
Start: 2022-12-31 | End: 2023-01-03 | Stop reason: HOSPADM

## 2022-12-31 RX ORDER — SODIUM CHLORIDE, SODIUM LACTATE, POTASSIUM CHLORIDE, CALCIUM CHLORIDE 600; 310; 30; 20 MG/100ML; MG/100ML; MG/100ML; MG/100ML
INJECTION, SOLUTION INTRAVENOUS CONTINUOUS PRN
Status: DISCONTINUED | OUTPATIENT
Start: 2022-12-31 | End: 2022-12-31 | Stop reason: SDUPTHER

## 2022-12-31 RX ORDER — PROCHLORPERAZINE EDISYLATE 5 MG/ML
5 INJECTION INTRAMUSCULAR; INTRAVENOUS
Status: DISCONTINUED | OUTPATIENT
Start: 2022-12-31 | End: 2022-12-31 | Stop reason: HOSPADM

## 2022-12-31 RX ORDER — SODIUM CHLORIDE 0.9 % (FLUSH) 0.9 %
5-40 SYRINGE (ML) INJECTION PRN
Status: DISCONTINUED | OUTPATIENT
Start: 2022-12-31 | End: 2022-12-31 | Stop reason: HOSPADM

## 2022-12-31 RX ADMIN — BUPIVACAINE HYDROCHLORIDE 30 ML: 5 INJECTION, SOLUTION EPIDURAL; INTRACAUDAL; PERINEURAL at 08:00

## 2022-12-31 RX ADMIN — OXYCODONE AND ACETAMINOPHEN 2 TABLET: 5; 325 TABLET ORAL at 02:07

## 2022-12-31 RX ADMIN — PHENYLEPHRINE HYDROCHLORIDE 100 MCG: 10 INJECTION, SOLUTION INTRAMUSCULAR; INTRAVENOUS; SUBCUTANEOUS at 09:15

## 2022-12-31 RX ADMIN — ROCURONIUM BROMIDE 50 MG: 10 INJECTION INTRAVENOUS at 08:11

## 2022-12-31 RX ADMIN — FENTANYL CITRATE 100 MCG: 50 INJECTION, SOLUTION INTRAMUSCULAR; INTRAVENOUS at 08:11

## 2022-12-31 RX ADMIN — OXYCODONE AND ACETAMINOPHEN 1 TABLET: 5; 325 TABLET ORAL at 18:57

## 2022-12-31 RX ADMIN — MICONAZOLE NITRATE: 20 POWDER TOPICAL at 19:31

## 2022-12-31 RX ADMIN — PHENYLEPHRINE HYDROCHLORIDE 100 MCG: 10 INJECTION, SOLUTION INTRAMUSCULAR; INTRAVENOUS; SUBCUTANEOUS at 08:20

## 2022-12-31 RX ADMIN — LEVOTHYROXINE SODIUM 175 MCG: 0.17 TABLET ORAL at 06:39

## 2022-12-31 RX ADMIN — OXYCODONE AND ACETAMINOPHEN 2 TABLET: 5; 325 TABLET ORAL at 06:39

## 2022-12-31 RX ADMIN — FAMOTIDINE 20 MG: 10 INJECTION, SOLUTION INTRAVENOUS at 21:55

## 2022-12-31 RX ADMIN — DEXAMETHASONE SODIUM PHOSPHATE 4 MG: 4 INJECTION, SOLUTION INTRAMUSCULAR; INTRAVENOUS at 08:48

## 2022-12-31 RX ADMIN — FENTANYL CITRATE 100 MCG: 50 INJECTION, SOLUTION INTRAMUSCULAR; INTRAVENOUS at 08:00

## 2022-12-31 RX ADMIN — HYDROMORPHONE HYDROCHLORIDE 0.5 MG: 1 INJECTION, SOLUTION INTRAMUSCULAR; INTRAVENOUS; SUBCUTANEOUS at 10:23

## 2022-12-31 RX ADMIN — PROPOFOL 200 MG: 10 INJECTION, EMULSION INTRAVENOUS at 08:11

## 2022-12-31 RX ADMIN — Medication 0.5 MG: at 10:47

## 2022-12-31 RX ADMIN — SODIUM CHLORIDE, SODIUM LACTATE, POTASSIUM CHLORIDE, AND CALCIUM CHLORIDE: .6; .31; .03; .02 INJECTION, SOLUTION INTRAVENOUS at 07:50

## 2022-12-31 RX ADMIN — ONDANSETRON 4 MG: 2 INJECTION INTRAMUSCULAR; INTRAVENOUS at 08:48

## 2022-12-31 RX ADMIN — SUGAMMADEX 200 MG: 100 INJECTION, SOLUTION INTRAVENOUS at 10:01

## 2022-12-31 RX ADMIN — CEFAZOLIN SODIUM 3 G: 1 POWDER, FOR SOLUTION INTRAMUSCULAR; INTRAVENOUS at 08:30

## 2022-12-31 ASSESSMENT — PAIN DESCRIPTION - LOCATION
LOCATION: FOOT
LOCATION: SHOULDER;LEG
LOCATION: SHOULDER;HEAD
LOCATION: SHOULDER
LOCATION: SHOULDER

## 2022-12-31 ASSESSMENT — PAIN DESCRIPTION - PAIN TYPE
TYPE: SURGICAL PAIN
TYPE: SURGICAL PAIN
TYPE: ACUTE PAIN

## 2022-12-31 ASSESSMENT — PAIN DESCRIPTION - ORIENTATION
ORIENTATION: RIGHT
ORIENTATION: MID;RIGHT
ORIENTATION: RIGHT
ORIENTATION: MID
ORIENTATION: MID;RIGHT

## 2022-12-31 ASSESSMENT — PAIN DESCRIPTION - FREQUENCY
FREQUENCY: CONTINUOUS

## 2022-12-31 ASSESSMENT — PAIN SCALES - GENERAL
PAINLEVEL_OUTOF10: 4
PAINLEVEL_OUTOF10: 7
PAINLEVEL_OUTOF10: 5
PAINLEVEL_OUTOF10: 7
PAINLEVEL_OUTOF10: 3
PAINLEVEL_OUTOF10: 8
PAINLEVEL_OUTOF10: 3
PAINLEVEL_OUTOF10: 6
PAINLEVEL_OUTOF10: 3

## 2022-12-31 ASSESSMENT — PAIN DESCRIPTION - ONSET
ONSET: ON-GOING

## 2022-12-31 ASSESSMENT — PAIN - FUNCTIONAL ASSESSMENT
PAIN_FUNCTIONAL_ASSESSMENT: PREVENTS OR INTERFERES SOME ACTIVE ACTIVITIES AND ADLS

## 2022-12-31 ASSESSMENT — PAIN DESCRIPTION - DESCRIPTORS
DESCRIPTORS: ACHING;DISCOMFORT
DESCRIPTORS: ACHING
DESCRIPTORS: ACHING;DISCOMFORT

## 2022-12-31 NOTE — PROGRESS NOTES
Pt A&O, VSS. Pt denies pain at this time, just some numbness in her R hand due to the pain block in surgery. Surgical sites CDI covered in mepilex dressing and arm in brace. Pt resting comfortably at this time. All fall and safety precautions in place, bed locked and in lowest position, call light and belongings within reach. Pt denies further needs at this time.

## 2022-12-31 NOTE — PLAN OF CARE
Problem: Safety - Adult  Goal: Free from fall injury  12/30/2022 2146 by Juanita Salguero RN  Outcome: Progressing    Problem: Pain  Goal: Verbalizes/displays adequate comfort level or baseline comfort level  12/30/2022 2146 by Juanita Salguero RN  Outcome: Progressing    Problem: Skin/Tissue Integrity  Goal: Absence of new skin breakdown  Description: 1. Monitor for areas of redness and/or skin breakdown  2. Assess vascular access sites hourly  3. Every 4-6 hours minimum:  Change oxygen saturation probe site  4. Every 4-6 hours:  If on nasal continuous positive airway pressure, respiratory therapy assess nares and determine need for appliance change or resting period.   12/30/2022 2146 by Juanita Salguero RN  Outcome: Progressing    Problem: ABCDS Injury Assessment  Goal: Absence of physical injury  12/30/2022 2146 by Juanita Salguero RN  Outcome: Progressing

## 2022-12-31 NOTE — PROGRESS NOTES
PACU Transfer Note    Vitals:    12/31/22 1055   BP: (!) 143/79   Pulse: (!) 108   Resp: 30   Temp: 97.2 °F (36.2 °C)   SpO2: 90%     BP & HRwithin 20% of baseline.     In: 530 [I.V.:530]  Out: 145 [Urine:145]    Pain assessment:  none  Pain Level: 3    Report given to Receiving unit RN.    12/31/2022 11:02 AM

## 2022-12-31 NOTE — BRIEF OP NOTE
Brief Postoperative Note      Patient: Laury Jauregui  YOB: 1945  MRN: [id number]    Date of Procedure: 12/31/2022    Pre-Op Diagnosis: Right proximal humerus fracture    Post-Op Diagnosis: Same       Procedure(s):  RIGHT SHOULDER TOTAL ARTHROPLASTY REVERSE    Surgeon(s): Nadia Sousa MD    Assistant:  Surgical Assistant: Kat Orozco    Anesthesia: General    Estimated Blood Loss (mL): less than 009     Complications: None    Specimens:   * No specimens in log *    Implants:  * No implants in log *      Drains:   Urinary Catheter 12/29/22 2 Way (Active)   Catheter Indications Prolonged immobilization (e.g. unstable thoracic or lumbar spine, multiple traumatic injuries such as pelvic fractures) 12/31/22 0700   Site Assessment No urethral drainage 12/31/22 0700   Urine Color Yellow 12/31/22 0700   Urine Appearance Clear 12/31/22 0700   Collection Container Standard 12/31/22 0700   Securement Method Securing device (Describe) 12/31/22 0700   Catheter Care Completed Yes 12/30/22 1410   Catheter Best Practices  Drainage tube clipped to bed;Catheter secured to thigh; Tamper seal intact; Bag below bladder;Bag not on floor; Lack of dependent loop in tubing;Drainage bag less than half full 12/31/22 0700   Status Draining 12/31/22 0700   Output (mL) 400 mL 12/31/22 7937       Findings: see dictated note    Electronically signed by Surya Fierro MD on 12/31/2022 at 9:37 AM

## 2022-12-31 NOTE — PROGRESS NOTES
Patient admitted to PACU # 13 from OR at [address] post [facility] - Right per Dr. Lanny Harada. Attached to PACU monitoring system and report received from anesthesia provider. Patient was reported to be hemodynamically stable during procedure. Patient drowsy on admission and denied pain. Pt arrived to pacu was unable to get BP initially d/t pt moving. Pt NSR on ST on monitor. IVF infusing. RUE surgical drsg c/d/I with ice pack and sling in place.

## 2022-12-31 NOTE — FLOWSHEET NOTE
Since arrival to pacu pt has been trying to remove EKG wires, surgical sling, O2 and BP cuff. Pt unfastened sling and removed O2 after several reminder the medical necessity. Pt A/OX4. Transportation requested.

## 2022-12-31 NOTE — PROGRESS NOTES
Pt returned to room 5503 following R total shoulder. Pt anxious upon arrival but denying pain at this time. Currently resting comfortably. Pt denies further needs at this time.

## 2022-12-31 NOTE — PROGRESS NOTES
IV access lost, this RN attempted to regain without success. Will have another RN re attempt as time allows.

## 2022-12-31 NOTE — PROGRESS NOTES
Hospitalist Progress Note      PCP: King Rojas MD    Date of Admission: 12/29/2022    Chief Complaint: fall while getting her mail out    Hospital Course:    [employer] 69 y.o. female  -History of hypertension B12 deficiency hypothyroidism  -She went out to get her mail, slipped and hit her left side of the head on the mailbox and right hip on the stairs  -States her neighbor saw her and tried to get her up or unable to and EMS was called she was brought to the emergency room. -In the ED initial vitals were stable, labs showed BUN of 21 anion gap 17 WBC of 20.3 with left shift. UA was unremarkable although specific gravity greater than 1.030. Multiple imaging done included multiple imagings were done significant for comminuted oblique fracture distal fibula and lateral malleolus extending into the syndesmosis of the ankle without fracture of the posterior or medial malleolus. There was diffuse soft tissue swelling noted. Right shoulder impacted fracture right humeral neck and humeral head. Leavy Lesches was consulted, recommended reverse total shoulder, left ankle fracture would need to be in cast boot and weightbearing as tolerated.   Patient to be admitted for further management     Subjective: seen post op, says pain is controlled, tolerated po intake without nausea and vomiting       Medications:  Reviewed    Infusion Medications    sodium chloride      sodium chloride       Scheduled Medications    ortho mix (with HYDROmorphone) injection   Injection On Call    sodium chloride flush  5-40 mL IntraVENous 2 times per day    acetaminophen  650 mg Oral Q6H    ceFAZolin (ANCEF) IVPB  3,000 mg IntraVENous Q8H    levothyroxine  175 mcg Oral Daily    sodium chloride flush  5-40 mL IntraVENous 2 times per day    famotidine (PEPCID) injection  20 mg IntraVENous BID    miconazole   Topical BID     PRN Meds: sodium chloride flush, sodium chloride, sodium chloride flush, sodium chloride, ondansetron **OR** ondansetron, polyethylene glycol, acetaminophen **OR** acetaminophen, morphine **OR** morphine, oxyCODONE-acetaminophen **OR** oxyCODONE-acetaminophen      Intake/Output Summary (Last 24 hours) at 12/31/2022 1545  Last data filed at 12/31/2022 1540  Gross per 24 hour   Intake 1210 ml   Output 1195 ml   Net 15 ml       Physical Exam Performed:    BP (!) 144/68   Pulse (!) 108   Temp 98.3 °F (36.8 °C) (Oral)   Resp (!) 106   Ht 5' (1.524 m)   Wt 295 lb 6.7 oz (134 kg)   SpO2 93%   BMI 57.69 kg/m²     General appearance: No apparent distress, appears stated age and cooperative. HEENT: Pupils equal, round, and reactive to light. Conjunctivae/corneas clear. Neck: Supple, with full range of motion. No jugular venous distention. Trachea midline. Respiratory:  Normal respiratory effort. Clear to auscultation, bilaterally without Rales/Wheezes/Rhonchi. Cardiovascular: Regular rate and rhythm with normal S1/S2 without murmurs, rubs or gallops. Abdomen: Soft, non-tender, non-distended with normal bowel sounds. Musculoskeletal: Right ue in sling  Skin: Skin color, texture, turgor normal.  No rashes or lesions. Neurologic:  Neurovascularly intact without any focal sensory/motor deficits. Cranial nerves: II-XII intact, grossly non-focal.  Psychiatric: Alert and oriented, thought content appropriate, normal insight  Capillary Refill: Brisk,< 3 seconds   Peripheral Pulses: +2 palpable, equal bilaterally       Labs:   Recent Labs     12/29/22  1731 12/30/22  0649   WBC 20.3* 8.5   HGB 12.5 10.6*   HCT 36.4 31.0*    263     Recent Labs     12/29/22  1731 12/30/22  0649    138   K 3.8 4.1    101   CO2 21 23   BUN 21* 19   CREATININE 0.9 1.0   CALCIUM 9.7 9.6     No results for input(s): AST, ALT, BILIDIR, BILITOT, ALKPHOS in the last 72 hours. No results for input(s): INR in the last 72 hours. No results for input(s): Pandora Shahab in the last 72 hours.     Urinalysis:      Lab Results Component Value Date/Time    NITRU Negative 12/29/2022 05:31 PM    BLOODU Negative 12/29/2022 05:31 PM    SPECGRAV >=1.030 12/29/2022 05:31 PM    GLUCOSEU Negative 12/29/2022 05:31 PM       Radiology:  XR SHOULDER RIGHT 1 VW   Final Result      Interval glenohumeral arthroplasty without complicating features. XR FOOT LEFT (MIN 3 VIEWS)   Final Result      Mildly displaced intra-articular fracture in the base of the 1st proximal phalanx. CT-3D RENDRING ON [address] [place] [address]   Final Result   1. Collapsed impacted humeral head secondary to superior displacement and impaction of the shaft with comminuted greater lesser tuberosity as described   2. 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 3. Subcoracoid loose bodies noted and a loose body posterior to the glenoid. CT SHOULDER RIGHT WO CONTRAST   Final Result   1. Collapsed impacted humeral head secondary to superior displacement and impaction of the shaft with comminuted greater lesser tuberosity as described   2. 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 3. Subcoracoid loose bodies noted and a loose body posterior to the glenoid. CT CERVICAL SPINE WO CONTRAST   Final Result      No fracture seen. Moderately advanced multilevel spondylotic change. CT HEAD WO CONTRAST   Final Result      No evidence of acute intracranial abnormality. XR CHEST PORTABLE   Final Result      Cardiomegaly. No acute pulmonary abnormality. XR HUMERUS RIGHT (MIN 2 VIEWS)   Final Result      Acute impacted fracture of the right humeral neck and humeral head         2 VIEWS OF THE LEFT HUMERUS on 12/29/2022      HISTORY: Pain and trauma. PROCEDURE:      AP and lateral  views were obtained. FINDINGS:      There is no sign of a discrete fracture-dislocation or joint effusion. There is no bony defect identified or loose body. There is no radiopaque foreign body      The AC joint and glenohumeral joint reveal degenerative changes      IMPRESSION:      No sign of any fracture or dislocation. Chronic degenerative changes of the glenohumeral and AC joint. TWO VIEWS OF THE  RIGHT SHOULDER on 12/29/2022      HISTORY: shoulder pain. PROCEDURE:      AP internal/external rotation views were obtained. FINDINGS:      There is impacted right proximal humeral neck oblique fracture and comminuted humeral head fracture with multiple fragments and lateral positioning of the distal humerus in relation humeral head. There is AC joint hypertrophy. No sign of any dislocation. IMPRESSION:      Comminuted and impacted right humeral neck and head fracture, without gross dislocation      LEFT TIBIA/FIBULA 2 VIEWS on 12/29/2022      HISTORY: Pain. Fell      PROCEDURE:      AP and lateral views were obtained, including the knee and ankle joints. FINDINGS:      There is normal alignment of the proximal and mid tibia and fibula. There is distal oblique fibular and lateral malleolus fracture. The knee is severely arthritic with severe medial lateral compartment narrowing and condylar spurring. There is no    radiopaque foreign body. IMPRESSION:      Normal radiographs of the proximal left tibia and fibula. Advanced arthritic changes noted at the knee with severe medial and lateral compartment narrowing condylar spurring and demineralization. . Distal left fibular fracture is noted extending in the    lateral malleolus         XR HUMERUS LEFT (MIN 2 VIEWS)   Final Result      Acute impacted fracture of the right humeral neck and humeral head         2 VIEWS OF THE LEFT HUMERUS on 12/29/2022      HISTORY: Pain and trauma. PROCEDURE:      AP and lateral  views were obtained. FINDINGS:      There is no sign of a discrete fracture-dislocation or joint effusion.  There is no bony defect identified or loose body. There is no radiopaque foreign body      The AC joint and glenohumeral joint reveal degenerative changes      IMPRESSION:      No sign of any fracture or dislocation. Chronic degenerative changes of the glenohumeral and AC joint. TWO VIEWS OF THE  RIGHT SHOULDER on 12/29/2022      HISTORY: shoulder pain. PROCEDURE:      AP internal/external rotation views were obtained. FINDINGS:      There is impacted right proximal humeral neck oblique fracture and comminuted humeral head fracture with multiple fragments and lateral positioning of the distal humerus in relation humeral head. There is AC joint hypertrophy. No sign of any dislocation. IMPRESSION:      Comminuted and impacted right humeral neck and head fracture, without gross dislocation      LEFT TIBIA/FIBULA 2 VIEWS on 12/29/2022      HISTORY: Pain. Fell      PROCEDURE:      AP and lateral views were obtained, including the knee and ankle joints. FINDINGS:      There is normal alignment of the proximal and mid tibia and fibula. There is distal oblique fibular and lateral malleolus fracture. The knee is severely arthritic with severe medial lateral compartment narrowing and condylar spurring. There is no    radiopaque foreign body. IMPRESSION:      Normal radiographs of the proximal left tibia and fibula. Advanced arthritic changes noted at the knee with severe medial and lateral compartment narrowing condylar spurring and demineralization. . Distal left fibular fracture is noted extending in the    lateral malleolus         XR SHOULDER RIGHT (MIN 2 VIEWS)   Final Result      Acute impacted fracture of the right humeral neck and humeral head         2 VIEWS OF THE LEFT HUMERUS on 12/29/2022      HISTORY: Pain and trauma. PROCEDURE:      AP and lateral  views were obtained. FINDINGS:      There is no sign of a discrete fracture-dislocation or joint effusion.  There is no bony defect identified or loose body. There is no radiopaque foreign body      The AC joint and glenohumeral joint reveal degenerative changes      IMPRESSION:      No sign of any fracture or dislocation. Chronic degenerative changes of the glenohumeral and AC joint. TWO VIEWS OF THE  RIGHT SHOULDER on 12/29/2022      HISTORY: shoulder pain. PROCEDURE:      AP internal/external rotation views were obtained. FINDINGS:      There is impacted right proximal humeral neck oblique fracture and comminuted humeral head fracture with multiple fragments and lateral positioning of the distal humerus in relation humeral head. There is AC joint hypertrophy. No sign of any dislocation. IMPRESSION:      Comminuted and impacted right humeral neck and head fracture, without gross dislocation      LEFT TIBIA/FIBULA 2 VIEWS on 12/29/2022      HISTORY: Pain. Fell      PROCEDURE:      AP and lateral views were obtained, including the knee and ankle joints. FINDINGS:      There is normal alignment of the proximal and mid tibia and fibula. There is distal oblique fibular and lateral malleolus fracture. The knee is severely arthritic with severe medial lateral compartment narrowing and condylar spurring. There is no    radiopaque foreign body. IMPRESSION:      Normal radiographs of the proximal left tibia and fibula. Advanced arthritic changes noted at the knee with severe medial and lateral compartment narrowing condylar spurring and demineralization. . Distal left fibular fracture is noted extending in the    lateral malleolus         XR ANKLE LEFT (MIN 3 VIEWS)   Final Result      Comminuted oblique fracture of the distal fibula and lateral malleolus extending into the syndesmosis of the ankle without discrete fracture of the posterior or medial malleolus at this time.  Diffuse soft tissue swelling noted         XR TIBIA FIBULA LEFT (2 VIEWS)   Final Result      Acute impacted fracture of the right humeral neck and humeral head 2 VIEWS OF THE LEFT HUMERUS on 12/29/2022      HISTORY: Pain and trauma. PROCEDURE:      AP and lateral  views were obtained. FINDINGS:      There is no sign of a discrete fracture-dislocation or joint effusion. There is no bony defect identified or loose body. There is no radiopaque foreign body      The AC joint and glenohumeral joint reveal degenerative changes      IMPRESSION:      No sign of any fracture or dislocation. Chronic degenerative changes of the glenohumeral and AC joint. TWO VIEWS OF THE  RIGHT SHOULDER on 12/29/2022      HISTORY: shoulder pain. PROCEDURE:      AP internal/external rotation views were obtained. FINDINGS:      There is impacted right proximal humeral neck oblique fracture and comminuted humeral head fracture with multiple fragments and lateral positioning of the distal humerus in relation humeral head. There is AC joint hypertrophy. No sign of any dislocation. IMPRESSION:      Comminuted and impacted right humeral neck and head fracture, without gross dislocation      LEFT TIBIA/FIBULA 2 VIEWS on 12/29/2022      HISTORY: Pain. Fell      PROCEDURE:      AP and lateral views were obtained, including the knee and ankle joints. FINDINGS:      There is normal alignment of the proximal and mid tibia and fibula. There is distal oblique fibular and lateral malleolus fracture. The knee is severely arthritic with severe medial lateral compartment narrowing and condylar spurring. There is no    radiopaque foreign body. IMPRESSION:      Normal radiographs of the proximal left tibia and fibula. Advanced arthritic changes noted at the knee with severe medial and lateral compartment narrowing condylar spurring and demineralization. . Distal left fibular fracture is noted extending in the    lateral malleolus                 Assessment/Plan:    Active Hospital Problems    Diagnosis Date Noted    Tibia/fibula fracture, right, open type I or II, initial encounter [S82.201B, S82.401B] 12/29/2022     Priority: Medium    Sinus tachycardia [R00.0] 12/29/2022     Priority: Medium    Closed fracture of neck of right humerus [S42.211A] 12/29/2022     Priority: Medium    Morbid obesity ([facility] [address] [E66.01] 12/29/2022     Priority: Medium     Comminuted oblique fracture distal fibula and lateral malleolus extending into the syndesmosis of the ankle without fracture of the posterior or medial malleolus. There was diffuse soft tissue swelling noted. -Pain medication, anti-inflammatory medications, weightbearing is as tolerated per Ortho     Right shoulder impacted fracture right humeral neck and humeral head. Niki Awad was consulted, recommended reverse total shoulder, left ankle fracture would need to be in cast boot and weightbearing as tolerated. S/p type and screen, check INR  cardiomegaly on x-ray, EKK without acute abnormalities  RIGHT SHOULDER TOTAL ARTHROPLASTY REVERSE by Dr. Herman Mendez, EBL < 100 mL     Leukocytosis, likely reactive, no signs of infection, UA is clear, sinus tachycardia likely due to anxiety and volume depletion     Sinus tachycardia, Start shortly after presented to the ED, had Fuentes catheter placed in the emergency room, after placement to was noted to be tachycardic heart rate 130s to 140s, telemetry was reviewed and appeared sinus tachycardia. Encourage PO intake, dc IVF     Morbid obesity Body mass index is 57.69 kg/m². - Complicating assessment and treatment. Placing patient at risk for multiple co-morbidities as well as early death and contributing to the patient's presentation.  on weight loss when appropriate. DVT Prophylaxis: SCDs, start pharmacological Px when ok with ortho  Diet: ADULT DIET;  Regular  Code Status: Full Code    PT/OT Eval Status: ordered    Dispo - continue inpatient care, pending therapy evaluations    Pete Sun MD  Internal Medicine Hospitalist

## 2022-12-31 NOTE — ANESTHESIA POSTPROCEDURE EVALUATION
Department of Anesthesiology  Postprocedure Note    Patient: Db Hernandez  MRN: [id number]  YOB: 1945  Date of evaluation: 12/31/2022      Procedure Summary     Date: 12/31/22 Room / Location: [facility] [address]    Anesthesia Start: 0027 Anesthesia Stop: 1008    Procedure: RIGHT SHOULDER TOTAL ARTHROPLASTY REVERSE (Right: Shoulder) Diagnosis:       Closed fracture of proximal end of right humerus, unspecified fracture morphology, initial encounter      (Right proximal humerus fracture)    Surgeons: Leslie Sultana MD Responsible Provider: Shorty Hills DO    Anesthesia Type: general, regional ASA Status: 3          Anesthesia Type: No value filed.     Tricia Phase I: Tricia Score: 10    Tricia Phase II:        Anesthesia Post Evaluation    Patient location during evaluation: PACU  Patient participation: complete - patient participated  Level of consciousness: awake and alert  Airway patency: patent  Nausea & Vomiting: no nausea and no vomiting  Cardiovascular status: blood pressure returned to baseline  Respiratory status: acceptable  Hydration status: euvolemic  Multimodal analgesia pain management approach

## 2022-12-31 NOTE — OP NOTE
Operative Note      Patient: Sanchez Castro  YOB: 1945  MRN: [id number]    Date of Procedure: 12/31/2022    Pre-Op Diagnosis: Right proximal humerus fracture    Post-Op Diagnosis: Same       Procedure(s):  RIGHT SHOULDER TOTAL ARTHROPLASTY REVERSE    Surgeon(s): Ajith Bravo MD    Assistant:   Surgical Assistant: Yaa Carrion    Anesthesia: General    Estimated Blood Loss (mL): less than 531     Complications: None    Specimens:   * No specimens in log *    Implants:  * No implants in log *      Drains:   Urinary Catheter 12/29/22 2 Way (Active)   Catheter Indications Prolonged immobilization (e.g. unstable thoracic or lumbar spine, multiple traumatic injuries such as pelvic fractures) 12/31/22 0700   Site Assessment No urethral drainage 12/31/22 0700   Urine Color Yellow 12/31/22 0700   Urine Appearance Clear 12/31/22 0700   Collection Container Standard 12/31/22 0700   Securement Method Securing device (Describe) 12/31/22 0700   Catheter Care Completed Yes 12/30/22 1410   Catheter Best Practices  Drainage tube clipped to bed;Catheter secured to thigh; Tamper seal intact; Bag below bladder;Bag not on floor; Lack of dependent loop in tubing;Drainage bag less than half full 12/31/22 0700   Status Draining 12/31/22 0700   Output (mL) 400 mL 12/31/22 1973       Findings: see note below    Detailed Description of Procedure:   Patient was identified and brought to OR where she was placed on the table in a supine position. After satisfactory induction of general anesthesia she was placed in a slight beach chair position. RIGHT upper extremity was prepped and draped in usual sterile fashion. Deltopectoral approach was utilized with sharp dissection through skin, bovie for hemostasis. Cephalic vein was identified and protected intact throughout the procedure. Clavicopectoral fascia was incised and the fracture was identified. The biceps was entrapped and tenotomized.   The lesser and greater tuberosity fragments were tagged with #2 fiberwire and mobilized. Humeral head was removed and the glenoid prepared. A standard [employer] glenoid baseplate was placed and secured with screws, a 42mm head was locked into place. The proximal humerus was presented and prepared and a fracture stem was cemented into place with a 0 humeral baseplate and 0 poly. Reduction was stable. The tuberosities were reassembled to the prosthesis as well as side to side with #2 fiberwire. Orthomix periarticular solution was placed. The wound was further irrigated. Deltopectoral fascia was reapproximated witih 0 Vicryl.  0 and 2-0 Vicryl was utilized for subQ and strattofix for skin.   Prineo was applied and dressing was placed in addition to a sling    Electronically signed by Binta North MD on 12/31/2022 at 8:06 AM

## 2022-12-31 NOTE — PROGRESS NOTES
Patient is alert and oriented. Vital signs are stable. Patient's pain is controlled with medication per MAR. Patient is on bedrest. NPO since midnight. Fuentes is in place and has adequate output. Bed is in the lowest position. Bed alarm is activated. Call light is within reach. Will continue to monitor and reassess.

## 2022-12-31 NOTE — ANESTHESIA PRE PROCEDURE
Department of Anesthesiology  Preprocedure Note       Name:  Roque Scott   Age:  69 y.o.  :  1945                                          MRN:  [id number]         Date:  2022      Surgeon: Adela Faria): Anju Robertson MD    Procedure: Procedure(s):  RIGHT SHOULDER TOTAL ARTHROPLASTY REVERSE    Medications prior to admission:   Prior to Admission medications    Medication Sig Start Date End Date Taking? Authorizing Provider   meloxicam (MOBIC) 15 MG tablet TAKE ONE TABLET BY MOUTH EVERY DAY  Patient taking differently: as needed 14   Lelan Leyden, MD   levothyroxine (SYNTHROID) 175 MCG tablet Take 1 tablet by mouth  daily. Take additional 1/2  tablet by mouth on Monday  and Wednesday  Patient taking differently: Take 175 mcg by mouth Daily Not taking the additional tablet anymore 14   Lelan Leyden, MD   lisinopril-hydrochlorothiazide (PRINZIDE;ZESTORETIC) 20-25 MG per tablet Take 0.5 tablets by mouth daily. Historical Provider, MD   cyanocobalamin 1000 MCG/ML injection Inject 1 mL into the skin once for 1 dose. Patient taking differently: Inject 1,000 mcg into the skin every 30 days Due mid January-last was dec 9th 8/11/12 8/11/12  Maine Marquez MD   multivitamin [facility]) per tablet Take 1 tablet by mouth daily.  12   Maine Marquez MD       Current medications:    Current Facility-Administered Medications   Medication Dose Route Frequency Provider Last Rate Last Admin    levothyroxine (SYNTHROID) tablet 175 mcg  175 mcg Oral Daily Nicolette Glover MD   175 mcg at 22 5975    sodium chloride flush 0.9 % injection 5-40 mL  5-40 mL IntraVENous 2 times per day Nicolette Glover MD   10 mL at 22    sodium chloride flush 0.9 % injection 5-40 mL  5-40 mL IntraVENous PRN Nicolette Glover MD        0.9 % sodium chloride infusion   IntraVENous PRN Nicolette Glover MD        ondansetron (ZOFRAN-ODT) disintegrating tablet 4 mg  4 mg Oral Q8H PRN Nicolette Glover MD Or    ondansetron (ZOFRAN) injection 4 mg  4 mg IntraVENous Q6H PRN Anel Pearce MD   4 mg at 12/31/22 0848    polyethylene glycol (GLYCOLAX) packet 17 g  17 g Oral Daily PRN Anel Pearce MD        acetaminophen (TYLENOL) tablet 650 mg  650 mg Oral Q6H PRN Anel Pearce MD        Or   [facility] acetaminophen (TYLENOL) suppository 650 mg  650 mg Rectal Q6H PRN Anel Pearce MD        famotidine (PEPCID) 20 mg in sodium chloride (PF) 0.9 % 10 mL injection  20 mg IntraVENous BID Anel Pearce MD   20 mg at 12/30/22 1956    morphine (PF) injection 2 mg  2 mg IntraVENous Q2H PRN Júnior Burt MD        Or   [facility] morphine (PF) injection 4 mg  4 mg IntraVENous Q2H PRN Júnior Burt MD        oxyCODONE-acetaminophen (PERCOCET) 5-325 MG per tablet 1 tablet  1 tablet Oral Q4H PRN Júnior Burt MD   1 tablet at 12/30/22 1613    Or    oxyCODONE-acetaminophen (PERCOCET) 5-325 MG per tablet 2 tablet  2 tablet Oral Q4H PRN Júnior Burt MD   2 tablet at 12/31/22 7251    miconazole (MICOTIN) 2 % powder   Topical BID Júnior Burt MD   Given at 12/30/22 2003     Facility-Administered Medications Ordered in Other Encounters   Medication Dose Route Frequency Provider Last Rate Last Admin    lactated ringers infusion   IntraVENous Continuous PRN Benjiman Shake, DO   New Bag at 12/31/22 0750    fentaNYL (SUBLIMAZE) injection   IntraVENous PRN Benjiman Shake, DO   100 mcg at 12/31/22 3153    propofol injection   IntraVENous PRN Benjiman Shake, DO   200 mg at 12/31/22 0414    rocuronium (ZEMURON) injection   IntraVENous PRN Benjiman Shake, DO   50 mg at 12/31/22 3305    bupivacaine (PF) (MARCAINE) 0.5 % injection   IntraDERmal PRN Benjiman Shake, DO   30 mL at 12/31/22 0800    dexamethasone (DECADRON) injection   IntraVENous PRN Benjiman Shake, DO   4 mg at 12/31/22 2899       Allergies:  No Known Allergies    Problem List:    Patient Active Problem List   Diagnosis 10.6 12/30/2022 06:49 AM    HCT 31.0 12/30/2022 06:49 AM    MCV 95.6 12/30/2022 06:49 AM    RDW 12.8 12/30/2022 06:49 AM     12/30/2022 06:49 AM       CMP:   Lab Results   Component Value Date/Time     12/30/2022 06:49 AM    K 4.1 12/30/2022 06:49 AM     12/30/2022 06:49 AM    CO2 23 12/30/2022 06:49 AM    BUN 19 12/30/2022 06:49 AM    CREATININE 1.0 12/30/2022 06:49 AM    GFRAA >60 04/22/2015 10:26 PM    GFRAA >60 06/04/2013 10:04 AM    LABGLOM 58 12/30/2022 06:49 AM    GLUCOSE 118 12/30/2022 06:49 AM    PROT 7.4 09/09/2014 09:20 AM    PROT 7.8 12/17/2012 09:48 AM    CALCIUM 9.6 12/30/2022 06:49 AM    BILITOT 0.3 09/09/2014 09:20 AM    ALKPHOS 64 09/09/2014 09:20 AM    AST 18 09/09/2014 09:20 AM    ALT 7 09/09/2014 09:20 AM       POC Tests: No results for input(s): POCGLU, POCNA, POCK, POCCL, POCBUN, POCHEMO, POCHCT in the last 72 hours.     Coags: No results found for: PROTIME, INR, APTT    HCG (If Applicable): No results found for: PREGTESTUR, PREGSERUM, HCG, HCGQUANT     ABGs: No results found for: PHART, PO2ART, QSM5GBR, GDV2TQM, BEART, N0VGYAIT     Type & Screen (If Applicable):  Lab Results   Component Value Date    LABABO O 08/08/2012    [address] Positive 08/08/2012       Drug/Infectious Status (If Applicable):  No results found for: HIV, HEPCAB    COVID-19 Screening (If Applicable): No results found for: COVID19        Anesthesia Evaluation  Patient summary reviewed and Nursing notes reviewed no history of anesthetic complications:   Airway: Mallampati: II  TM distance: >3 FB   Neck ROM: limited  Mouth opening: > = 3 FB   Dental: normal exam         Pulmonary:   (+) sleep apnea: on noncompliant,      (-) not a current smoker                           Cardiovascular:  Exercise tolerance: good (>4 METS),   (+) hypertension:,         Rhythm: regular  Rate: normal                 ROS comment: Denies CP/SOB     Neuro/Psych:               GI/Hepatic/Renal:   (+) morbid obesity          Endo/Other: (+) hypothyroidism: arthritis: OA., .                 Abdominal:             Vascular: Other Findings:           Anesthesia Plan      general and regional     ASA 3     (Interscalene nerve block PSR)  Induction: intravenous. MIPS: Prophylactic antiemetics administered. Anesthetic plan and risks discussed with patient.                         Mamie Saleem DO   12/31/2022

## 2022-12-31 NOTE — PLAN OF CARE
Problem: Safety - Adult  Goal: Free from fall injury  12/31/2022 0732 by Max Marshall RN  Outcome: Progressing     Problem: Pain  Goal: Verbalizes/displays adequate comfort level or baseline comfort level  12/31/2022 0732 by Max Marshall RN  Outcome: Progressing     Problem: Discharge Planning  Goal: Discharge to home or other facility with appropriate resources  Outcome: Progressing     Problem: Skin/Tissue Integrity  Goal: Absence of new skin breakdown  Description: 1. Monitor for areas of redness and/or skin breakdown  2. Assess vascular access sites hourly  3. Every 4-6 hours minimum:  Change oxygen saturation probe site  4. Every 4-6 hours:  If on nasal continuous positive airway pressure, respiratory therapy assess nares and determine need for appliance change or resting period.   12/31/2022 0732 by Max Marshall RN  Outcome: Progressing     Problem: ABCDS Injury Assessment  Goal: Absence of physical injury  12/31/2022 0732 by Max Marshall RN  Outcome: Progressing

## 2022-12-31 NOTE — PROGRESS NOTES
4 Eyes Admission Assessment     I agree as the admission nurse that 2 RN's have performed a thorough Head to Toe Skin Assessment on the patient. ALL assessment sites listed below have been assessed on admission. Areas assessed by both nurses:   [x]   Head, Face, and Ears   [x]   Shoulders, Back, and Chest  [x]   Arms, Elbows, and Hands   [x]   Coccyx, Sacrum, and Ischium  [x]   Legs, Feet, and Heels  Open wound to L ankle, scattered redness LUE    Does the Patient have Skin Breakdown?   No         Carlos Alberto Prevention initiated:  No   Wound Care Orders initiated:  No      WOC nurse consulted for Pressure Injury (Stage 3,4, Unstageable, DTI, NWPT, and Complex wounds) or Carlos Alberto score 18 or lower:  NA      Nurse 1 eSignature: Electronically signed by Kathy Kaplan RN on 12/31/22 at 11:39 AM EST    **SHARE this note so that the co-signing nurse is able to place an eSignature**    Nurse 2 eSignature: Electronically signed by Griffin Muro RN on 12/31/22 at 11:44 AM EST

## 2023-01-01 PROCEDURE — 2580000003 HC RX 258: Performed by: INTERNAL MEDICINE

## 2023-01-01 PROCEDURE — 2580000003 HC RX 258: Performed by: ORTHOPAEDIC SURGERY

## 2023-01-01 PROCEDURE — 97530 THERAPEUTIC ACTIVITIES: CPT

## 2023-01-01 PROCEDURE — 6370000000 HC RX 637 (ALT 250 FOR IP): Performed by: ORTHOPAEDIC SURGERY

## 2023-01-01 PROCEDURE — 1200000000 HC SEMI PRIVATE

## 2023-01-01 PROCEDURE — 97162 PT EVAL MOD COMPLEX 30 MIN: CPT

## 2023-01-01 PROCEDURE — 6370000000 HC RX 637 (ALT 250 FOR IP): Performed by: INTERNAL MEDICINE

## 2023-01-01 PROCEDURE — 6360000002 HC RX W HCPCS: Performed by: ORTHOPAEDIC SURGERY

## 2023-01-01 PROCEDURE — 97535 SELF CARE MNGMENT TRAINING: CPT

## 2023-01-01 PROCEDURE — A4216 STERILE WATER/SALINE, 10 ML: HCPCS | Performed by: INTERNAL MEDICINE

## 2023-01-01 PROCEDURE — 97166 OT EVAL MOD COMPLEX 45 MIN: CPT

## 2023-01-01 PROCEDURE — 2500000003 HC RX 250 WO HCPCS: Performed by: INTERNAL MEDICINE

## 2023-01-01 RX ADMIN — SODIUM CHLORIDE: 9 INJECTION, SOLUTION INTRAVENOUS at 00:24

## 2023-01-01 RX ADMIN — LEVOTHYROXINE SODIUM 175 MCG: 0.17 TABLET ORAL at 06:08

## 2023-01-01 RX ADMIN — CEFAZOLIN 3000 MG: 10 INJECTION, POWDER, FOR SOLUTION INTRAVENOUS at 00:25

## 2023-01-01 RX ADMIN — ACETAMINOPHEN 650 MG: 325 TABLET ORAL at 18:03

## 2023-01-01 RX ADMIN — OXYCODONE AND ACETAMINOPHEN 2 TABLET: 5; 325 TABLET ORAL at 08:13

## 2023-01-01 RX ADMIN — FAMOTIDINE 20 MG: 10 INJECTION, SOLUTION INTRAVENOUS at 19:49

## 2023-01-01 RX ADMIN — OXYCODONE AND ACETAMINOPHEN 2 TABLET: 5; 325 TABLET ORAL at 19:49

## 2023-01-01 RX ADMIN — MICONAZOLE NITRATE: 20 POWDER TOPICAL at 08:13

## 2023-01-01 RX ADMIN — MICONAZOLE NITRATE: 20 POWDER TOPICAL at 19:49

## 2023-01-01 RX ADMIN — ACETAMINOPHEN 650 MG: 325 TABLET ORAL at 11:47

## 2023-01-01 RX ADMIN — FAMOTIDINE 20 MG: 10 INJECTION, SOLUTION INTRAVENOUS at 08:13

## 2023-01-01 RX ADMIN — SODIUM CHLORIDE, PRESERVATIVE FREE 10 ML: 5 INJECTION INTRAVENOUS at 19:52

## 2023-01-01 RX ADMIN — ACETAMINOPHEN 650 MG: 325 TABLET ORAL at 00:25

## 2023-01-01 RX ADMIN — SODIUM CHLORIDE, PRESERVATIVE FREE 5 ML: 5 INJECTION INTRAVENOUS at 08:14

## 2023-01-01 RX ADMIN — OXYCODONE AND ACETAMINOPHEN 1 TABLET: 5; 325 TABLET ORAL at 04:16

## 2023-01-01 RX ADMIN — OXYCODONE AND ACETAMINOPHEN 2 TABLET: 5; 325 TABLET ORAL at 14:23

## 2023-01-01 ASSESSMENT — PAIN DESCRIPTION - FREQUENCY: FREQUENCY: CONTINUOUS

## 2023-01-01 ASSESSMENT — PAIN DESCRIPTION - LOCATION
LOCATION: ARM;LEG;SHOULDER
LOCATION: SHOULDER

## 2023-01-01 ASSESSMENT — PAIN SCALES - GENERAL
PAINLEVEL_OUTOF10: 8
PAINLEVEL_OUTOF10: 6
PAINLEVEL_OUTOF10: 0
PAINLEVEL_OUTOF10: 7
PAINLEVEL_OUTOF10: 0
PAINLEVEL_OUTOF10: 7
PAINLEVEL_OUTOF10: 10
PAINLEVEL_OUTOF10: 3

## 2023-01-01 ASSESSMENT — PAIN DESCRIPTION - DESCRIPTORS
DESCRIPTORS: ACHING;DISCOMFORT
DESCRIPTORS: ACHING;DISCOMFORT

## 2023-01-01 ASSESSMENT — PAIN DESCRIPTION - ORIENTATION
ORIENTATION: RIGHT

## 2023-01-01 ASSESSMENT — PAIN - FUNCTIONAL ASSESSMENT: PAIN_FUNCTIONAL_ASSESSMENT: PREVENTS OR INTERFERES SOME ACTIVE ACTIVITIES AND ADLS

## 2023-01-01 ASSESSMENT — PAIN DESCRIPTION - PAIN TYPE: TYPE: SURGICAL PAIN

## 2023-01-01 ASSESSMENT — PAIN DESCRIPTION - ONSET: ONSET: ON-GOING

## 2023-01-01 NOTE — PROGRESS NOTES
Occupational /Physical Therapy  Attempt x3 to see for therapy evals this AM. Pt with WBAT status with tall ankle boot. Pt does not have boot at this time. Have contacted nursing staff as well as charge nurse for getting appropriate boot for WB status with mobility. RN aware and planning to contact therapy when appropriate. Will follow up.      Indio Belcher, MOT, OTR/L  Jeramie Crook, PT

## 2023-01-01 NOTE — PROGRESS NOTES
Occupational Therapy  Facility/Department: [facility] 5T ORTHO/NEURO  Occupational Therapy Initial Assessment    Name: Jr Christianson  : 1945  MRN: [id number]  Date of Service: 2023    Discharge Recommendations: Jr Christianson scored a 10/24 on the AM-PAC ADL Inpatient form. Current research shows that an AM-PAC score of 17 or less is typically not associated with a discharge to the patient's home setting. Based on the patient's AM-PAC score and their current ADL deficits, it is recommended that the patient have 3-5 sessions per week of Occupational Therapy at d/c to increase the patient's independence. Please see assessment section for further patient specific details. If patient discharges prior to next session this note will serve as a discharge summary. Please see below for the latest assessment towards goals. OT Equipment Recommendations  Equipment Needed: No       Patient Diagnosis(es): The primary encounter diagnosis was Fall from standing, initial encounter. Diagnoses of Fx humeral neck, right, closed, initial encounter and Closed fracture of distal end of left fibula, unspecified fracture morphology, initial encounter were also pertinent to this visit. Past Medical History:  has a past medical history of Arthritis, Blood transfusion, Hyperlipidemia, Hypertension, Hypothyroid, and Rheumatic fever. Past Surgical History:  has a past surgical history that includes Bunionectomy () and Cholecystectomy (). Assessment   Performance deficits / Impairments: Decreased functional mobility ; Decreased ADL status; Decreased endurance;Decreased ROM  Assessment: From home alone, pt with fall, now in shoulder sling, shoulder replacement, ankle fx. Now in ankle boot (L) and sling (R). Pt requiring Ax2 with all bed mobility. Sitting EOB with assist. Assist with ADLs. Total A to effie boot and adjust sling, gown. Pt requiring encouragement and education.  Pt attempting scooting EOB but scooting forward 2/2 posterior lean. Ax3 to get back to bed. Would benefit from cont skilled inpt at [place]. Decision Making: Medium Complexity  REQUIRES OT FOLLOW-UP: Yes  Activity Tolerance  Activity Tolerance: Patient limited by fatigue;Patient limited by pain  Activity Tolerance Comments: pt very anxious, particular, requiring encouragement        Plan   Occupational Therapy Plan  Times Per Week: 7     Restrictions  Position Activity Restriction  Other position/activity restrictions: per surgeon Dr Linnette Mcmanus  in room with therapy currently, pt is okay with standard sling not shoulder immobilizer, okay to use short boot 2/2 pt height rather than tall boot. Subjective   General  Chart Reviewed: Yes  Additional Pertinent Hx: 69 y.o. female  -History of hypertension B12 deficiency hypothyroidism  -She went out to get her mail, slipped and hit her left side of the head on the mailbox and right hip on the stairs  -States her neighbor saw her and tried to get her up or unable to and EMS was called she was brought to the emergency room. Right shoulder impacted fracture right humeral neck and humeral head. Ellen Pickering was consulted, recommended reverse total shoulder, left ankle fracture would need to be in cast boot and weightbearing as tolerated. Referring Practitioner: Maykel Moody MD  Subjective  Subjective: In bed agreeable to session, \"I dont know what I can do, it hurts\"  General Comment  Comments: per surgeon Dr Linnette Mcmanus in room with therapy currently, pt is okay with standard sling not shoulder immobilizer, okay to use short boot 2/2 pt height rather than tall boot.      Social/Functional History  Social/Functional History  Lives With: Alone (son lives close by and can assist occ.)  Type of Home: House  Home Layout: Performs ADL's on one level, Able to Live on Main level with bedroom/bathroom, Two level  Home Access: Stairs to enter with rails  Entrance Stairs - Number of Steps: 3  Bathroom Shower/Tub: Tub/Shower unit  Bathroom Equipment: Grab bars in shower  Home Equipment: [place], quad  Has the patient had two or more falls in the past year or any fall with injury in the past year?: Yes  ADL Assistance: [address]: Needs assistance (grocery delivery, son does laundry)  Ambulation Assistance: Independent (cane outside of home)  Transfer Assistance: Independent  Active : No                    Safety Devices  Type of Devices: All fall risk precautions in place;Call light within reach;Gait belt;Patient at risk for falls;Nurse notified; Bed alarm in place; Left in bed  Bed Mobility Training  Bed Mobility Training: Yes  Supine to Sit: Maximum assistance;Assist X2  Sit to Supine:  Total assistance (Total assist x3)  Balance  Sitting: With support (EOB 20-25 min SBA static sitting, to Mod/Max with dynamic attempts at scooting pt with posterior lean sliding forward)  Standing:  (unable)  Transfer Training  Transfer Training: No (unable)        ADL  Grooming: Setup;Stand by assistance (with LUE needing encouragement)  UE Dressing: Dependent/Total  LE Dressing: Dependent/Total  Toileting: Dependent/Total              Vision  Vision: Within Functional Limits  Hearing  Hearing: Within functional limits  Cognition  Overall Cognitive Status: [facility]  Cognition Comment: anxious, needing encouragement  Orientation  Overall Orientation Status: Within Functional Limits                  Education Given To: Patient  Education Provided: Role of Therapy;Plan of Care;Transfer Training;ADL Adaptive Strategies  Education Method: Demonstration;Verbal  Barriers to Learning: None  Education Outcome: Continued education needed                     AM-PAC Score        AM-[facility] Inpatient Daily Activity Raw Score: 10 (01/01/23 1502)  AM-PAC Inpatient ADL T-Scale Score : 27.31 (01/01/23 1502)  ADL Inpatient CMS 0-100% Score: 74.7 (01/01/23 1502)  ADL Inpatient CMS G-Code Modifier : CL (01/01/23 1502)      Goals  Short Term Goals  Time Frame for Short Term Goals: dc  Short Term Goal 1: supine to sit with ModA x1  Short Term Goal 2: sit balance static and dynamic with SBA  Short Term Goal 3: grooming with spvn seated EOB  Short Term Goal 4: feeding with SPVN       Therapy Time   Individual Concurrent Group Co-treatment   Time In 1300         Time Out 1410         Minutes 70          Timed Code Treatment Minutes:   55    Total Treatment Minutes:  800 Jj Olivo, OT

## 2023-01-01 NOTE — PROGRESS NOTES
Attempted to get patient oob to [facility], patient unable to sit at bedside with max assist.

## 2023-01-01 NOTE — PLAN OF CARE
Problem: Safety - Adult  Goal: Free from fall injury  Outcome: Progressing   Fall precautions in place. Bed alarm activated, low position, wheels locked. Call light and belongings within reach. Continue to monitor safety. Problem: Pain  Goal: Verbalizes/displays adequate comfort level or baseline comfort level  Outcome: Progressing   C/o 6/10 pain in R shoulder. Medicated with scheduled tylenol with adequate relief. Will monitor. Problem: Discharge Planning  Goal: Discharge to home or other facility with appropriate resources  Outcome: Progressing   Patient to be discharged when medically stable. Problem: Skin/Tissue Integrity  Goal: Absence of new skin breakdown  Description: 1. Monitor for areas of redness and/or skin breakdown  2. Assess vascular access sites hourly  3. Every 4-6 hours minimum:  Change oxygen saturation probe site  4. Every 4-6 hours:  If on nasal continuous positive airway pressure, respiratory therapy assess nares and determine need for appliance change or resting period. Outcome: Progressing   No new areas of skin breakdown noted. Will monitor for skin breakdown.

## 2023-01-01 NOTE — PLAN OF CARE
Problem: Safety - Adult  Goal: Free from fall injury  Outcome: Progressing  Flowsheets (Taken 1/1/2023 1547)  Free From Fall Injury: Instruct family/caregiver on patient safety     Problem: Pain  Goal: Verbalizes/displays adequate comfort level or baseline comfort level  Outcome: Progressing  Flowsheets (Taken 1/1/2023 1547)  Verbalizes/displays adequate comfort level or baseline comfort level:   Encourage patient to monitor pain and request assistance   Assess pain using appropriate pain scale   Administer analgesics based on type and severity of pain and evaluate response   Consider cultural and social influences on pain and pain management   Implement non-pharmacological measures as appropriate and evaluate response   Notify Licensed Independent Practitioner if interventions unsuccessful or patient reports new pain     Problem: Discharge Planning  Goal: Discharge to home or other facility with appropriate resources  Outcome: Progressing  Flowsheets (Taken 1/1/2023 1547)  Discharge to home or other facility with appropriate resources:   Identify barriers to discharge with patient and caregiver   Identify discharge learning needs (meds, wound care, etc)   Arrange for needed discharge resources and transportation as appropriate   Refer to discharge planning if patient needs post-hospital services based on physician order or complex needs related to functional status, cognitive ability or social support system     Problem: ABCDS Injury Assessment  Goal: Absence of physical injury  Outcome: Progressing  Flowsheets (Taken 1/1/2023 1547)  Absence of Physical Injury: Implement safety measures based on patient assessment

## 2023-01-01 NOTE — PROGRESS NOTES
Hospitalist Progress Note      PCP: Iman Perez MD    Date of Admission: 12/29/2022    Chief Complaint: fall while getting her mail out    Hospital Course:    Ema Rhodes 69 y.o. female  -History of hypertension B12 deficiency hypothyroidism  -She went out to get her mail, slipped and hit her left side of the head on the mailbox and right hip on the stairs  -States her neighbor saw her and tried to get her up or unable to and EMS was called she was brought to the emergency room. -In the ED initial vitals were stable, labs showed BUN of 21 anion gap 17 WBC of 20.3 with left shift. UA was unremarkable although specific gravity greater than 1.030. Multiple imaging done included multiple imagings were done significant for comminuted oblique fracture distal fibula and lateral malleolus extending into the syndesmosis of the ankle without fracture of the posterior or medial malleolus. There was diffuse soft tissue swelling noted. Right shoulder impacted fracture right humeral neck and humeral head. Brendolyn Means was consulted, recommended reverse total shoulder, left ankle fracture would need to be in cast boot and weightbearing as tolerated. Patient to be admitted for further management     Subjective:   Patient was seen and examined at bedside today, alert and oriented. Pleasant lady. No acute events reported or observed overnight. Patient did not report any new complaints. Denied fever, tolerating p.o. diet.         Medications:  Reviewed    Infusion Medications    sodium chloride 50 mL/hr at 01/01/23 0024    sodium chloride       Scheduled Medications    sodium chloride flush  5-40 mL IntraVENous 2 times per day    acetaminophen  650 mg Oral Q6H    levothyroxine  175 mcg Oral Daily    sodium chloride flush  5-40 mL IntraVENous 2 times per day    famotidine (PEPCID) injection  20 mg IntraVENous BID    miconazole   Topical BID     PRN Meds: sodium chloride flush, sodium chloride, sodium chloride flush, sodium chloride, ondansetron **OR** ondansetron, polyethylene glycol, acetaminophen **OR** acetaminophen, morphine **OR** morphine, oxyCODONE-acetaminophen **OR** oxyCODONE-acetaminophen      Intake/Output Summary (Last 24 hours) at 1/1/2023 0903  Last data filed at 1/1/2023 0500  Gross per 24 hour   Intake 1330 ml   Output 620 ml   Net 710 ml       Physical Exam Performed:    BP (!) 141/73   Pulse (!) 108   Temp 98 °F (36.7 °C) (Oral)   Resp 16   Ht 5' (1.524 m)   Wt (!) 305 lb 12.5 oz (138.7 kg)   SpO2 95%   BMI 59.72 kg/m²     General appearance: Morbidly obese no apparent distress, appears stated age and cooperative. HEENT: Pupils equal, round, and reactive to light. Conjunctivae/corneas clear. Bruise around her left eye, improving. Neck: Supple, with full range of motion. No jugular venous distention. Trachea midline. Respiratory:  Normal respiratory effort. Clear to auscultation, bilaterally without Rales/Wheezes/Rhonchi. Cardiovascular: Regular rate and rhythm with normal S1/S2 without murmurs, rubs or gallops. Abdomen: Soft, non-tender, non-distended with normal bowel sounds. Musculoskeletal: Right ue in sling  Skin: Skin color, texture, turgor normal.  No rashes or lesions. Neurologic:  Neurovascularly intact without any focal sensory/motor deficits. Cranial nerves: II-XII intact, grossly non-focal.  Psychiatric: Alert and oriented, thought content appropriate, normal insight  Capillary Refill: Brisk,< 3 seconds       Labs:   Recent Labs     12/29/22  1731 12/30/22  0649   WBC 20.3* 8.5   HGB 12.5 10.6*   HCT 36.4 31.0*    263     Recent Labs     12/29/22  1731 12/30/22  0649    138   K 3.8 4.1    101   CO2 21 23   BUN 21* 19   CREATININE 0.9 1.0   CALCIUM 9.7 9.6     No results for input(s): AST, ALT, BILIDIR, BILITOT, ALKPHOS in the last 72 hours. No results for input(s): INR in the last 72 hours.   No results for input(s): Sushma Machuca in the last 72 hours.    Urinalysis:      Lab Results   Component Value Date/Time    NITRU Negative 12/29/2022 05:31 PM    BLOODU Negative 12/29/2022 05:31 PM    SPECGRAV >=1.030 12/29/2022 05:31 PM    GLUCOSEU Negative 12/29/2022 05:31 PM       Radiology:  XR SHOULDER RIGHT 1 VW   Final Result      Interval glenohumeral arthroplasty without complicating features. XR FOOT LEFT (MIN 3 VIEWS)   Final Result      Mildly displaced intra-articular fracture in the base of the 1st proximal phalanx. CT-3D RENDRING ON [address] [place] [address]   Final Result   1. Collapsed impacted humeral head secondary to superior displacement and impaction of the shaft with comminuted greater lesser tuberosity as described   2. 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 3. Subcoracoid loose bodies noted and a loose body posterior to the glenoid. CT SHOULDER RIGHT WO CONTRAST   Final Result   1. Collapsed impacted humeral head secondary to superior displacement and impaction of the shaft with comminuted greater lesser tuberosity as described   2. 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 3. Subcoracoid loose bodies noted and a loose body posterior to the glenoid. CT CERVICAL SPINE WO CONTRAST   Final Result      No fracture seen. Moderately advanced multilevel spondylotic change. CT HEAD WO CONTRAST   Final Result      No evidence of acute intracranial abnormality. XR CHEST PORTABLE   Final Result      Cardiomegaly. No acute pulmonary abnormality. XR HUMERUS RIGHT (MIN 2 VIEWS)   Final Result      Acute impacted fracture of the right humeral neck and humeral head         2 VIEWS OF THE LEFT HUMERUS on 12/29/2022      HISTORY: Pain and trauma. PROCEDURE:      AP and lateral  views were obtained.       FINDINGS:      There is no sign of a discrete fracture-dislocation or joint effusion. There is no bony defect identified or loose body. There is no radiopaque foreign body      The AC joint and glenohumeral joint reveal degenerative changes      IMPRESSION:      No sign of any fracture or dislocation. Chronic degenerative changes of the glenohumeral and AC joint. TWO VIEWS OF THE  RIGHT SHOULDER on 12/29/2022      HISTORY: shoulder pain. PROCEDURE:      AP internal/external rotation views were obtained. FINDINGS:      There is impacted right proximal humeral neck oblique fracture and comminuted humeral head fracture with multiple fragments and lateral positioning of the distal humerus in relation humeral head. There is AC joint hypertrophy. No sign of any dislocation. IMPRESSION:      Comminuted and impacted right humeral neck and head fracture, without gross dislocation      LEFT TIBIA/FIBULA 2 VIEWS on 12/29/2022      HISTORY: Pain. Fell      PROCEDURE:      AP and lateral views were obtained, including the knee and ankle joints. FINDINGS:      There is normal alignment of the proximal and mid tibia and fibula. There is distal oblique fibular and lateral malleolus fracture. The knee is severely arthritic with severe medial lateral compartment narrowing and condylar spurring. There is no    radiopaque foreign body. IMPRESSION:      Normal radiographs of the proximal left tibia and fibula. Advanced arthritic changes noted at the knee with severe medial and lateral compartment narrowing condylar spurring and demineralization. . Distal left fibular fracture is noted extending in the    lateral malleolus         XR HUMERUS LEFT (MIN 2 VIEWS)   Final Result      Acute impacted fracture of the right humeral neck and humeral head         2 VIEWS OF THE LEFT HUMERUS on 12/29/2022      HISTORY: Pain and trauma. PROCEDURE:      AP and lateral  views were obtained.       FINDINGS:      There is no sign of a discrete fracture-dislocation or joint effusion. There is no bony defect identified or loose body. There is no radiopaque foreign body      The AC joint and glenohumeral joint reveal degenerative changes      IMPRESSION:      No sign of any fracture or dislocation. Chronic degenerative changes of the glenohumeral and AC joint. TWO VIEWS OF THE  RIGHT SHOULDER on 12/29/2022      HISTORY: shoulder pain. PROCEDURE:      AP internal/external rotation views were obtained. FINDINGS:      There is impacted right proximal humeral neck oblique fracture and comminuted humeral head fracture with multiple fragments and lateral positioning of the distal humerus in relation humeral head. There is AC joint hypertrophy. No sign of any dislocation. IMPRESSION:      Comminuted and impacted right humeral neck and head fracture, without gross dislocation      LEFT TIBIA/FIBULA 2 VIEWS on 12/29/2022      HISTORY: Pain. Fell      PROCEDURE:      AP and lateral views were obtained, including the knee and ankle joints. FINDINGS:      There is normal alignment of the proximal and mid tibia and fibula. There is distal oblique fibular and lateral malleolus fracture. The knee is severely arthritic with severe medial lateral compartment narrowing and condylar spurring. There is no    radiopaque foreign body. IMPRESSION:      Normal radiographs of the proximal left tibia and fibula. Advanced arthritic changes noted at the knee with severe medial and lateral compartment narrowing condylar spurring and demineralization. . Distal left fibular fracture is noted extending in the    lateral malleolus         XR SHOULDER RIGHT (MIN 2 VIEWS)   Final Result      Acute impacted fracture of the right humeral neck and humeral head         2 VIEWS OF THE LEFT HUMERUS on 12/29/2022      HISTORY: Pain and trauma. PROCEDURE:      AP and lateral  views were obtained.       FINDINGS:      There is no sign of a discrete fracture-dislocation or joint effusion. There is no bony defect identified or loose body. There is no radiopaque foreign body      The AC joint and glenohumeral joint reveal degenerative changes      IMPRESSION:      No sign of any fracture or dislocation. Chronic degenerative changes of the glenohumeral and AC joint. TWO VIEWS OF THE  RIGHT SHOULDER on 12/29/2022      HISTORY: shoulder pain. PROCEDURE:      AP internal/external rotation views were obtained. FINDINGS:      There is impacted right proximal humeral neck oblique fracture and comminuted humeral head fracture with multiple fragments and lateral positioning of the distal humerus in relation humeral head. There is AC joint hypertrophy. No sign of any dislocation. IMPRESSION:      Comminuted and impacted right humeral neck and head fracture, without gross dislocation      LEFT TIBIA/FIBULA 2 VIEWS on 12/29/2022      HISTORY: Pain. Fell      PROCEDURE:      AP and lateral views were obtained, including the knee and ankle joints. FINDINGS:      There is normal alignment of the proximal and mid tibia and fibula. There is distal oblique fibular and lateral malleolus fracture. The knee is severely arthritic with severe medial lateral compartment narrowing and condylar spurring. There is no    radiopaque foreign body. IMPRESSION:      Normal radiographs of the proximal left tibia and fibula. Advanced arthritic changes noted at the knee with severe medial and lateral compartment narrowing condylar spurring and demineralization. . Distal left fibular fracture is noted extending in the    lateral malleolus         XR ANKLE LEFT (MIN 3 VIEWS)   Final Result      Comminuted oblique fracture of the distal fibula and lateral malleolus extending into the syndesmosis of the ankle without discrete fracture of the posterior or medial malleolus at this time.  Diffuse soft tissue swelling noted         XR TIBIA FIBULA LEFT (2 VIEWS)   Final Result      Acute impacted fracture of the right humeral neck and humeral head         2 VIEWS OF THE LEFT HUMERUS on 12/29/2022      HISTORY: Pain and trauma. PROCEDURE:      AP and lateral  views were obtained. FINDINGS:      There is no sign of a discrete fracture-dislocation or joint effusion. There is no bony defect identified or loose body. There is no radiopaque foreign body      The AC joint and glenohumeral joint reveal degenerative changes      IMPRESSION:      No sign of any fracture or dislocation. Chronic degenerative changes of the glenohumeral and AC joint. TWO VIEWS OF THE  RIGHT SHOULDER on 12/29/2022      HISTORY: shoulder pain. PROCEDURE:      AP internal/external rotation views were obtained. FINDINGS:      There is impacted right proximal humeral neck oblique fracture and comminuted humeral head fracture with multiple fragments and lateral positioning of the distal humerus in relation humeral head. There is AC joint hypertrophy. No sign of any dislocation. IMPRESSION:      Comminuted and impacted right humeral neck and head fracture, without gross dislocation      LEFT TIBIA/FIBULA 2 VIEWS on 12/29/2022      HISTORY: Pain. Fell      PROCEDURE:      AP and lateral views were obtained, including the knee and ankle joints. FINDINGS:      There is normal alignment of the proximal and mid tibia and fibula. There is distal oblique fibular and lateral malleolus fracture. The knee is severely arthritic with severe medial lateral compartment narrowing and condylar spurring. There is no    radiopaque foreign body. IMPRESSION:      Normal radiographs of the proximal left tibia and fibula. Advanced arthritic changes noted at the knee with severe medial and lateral compartment narrowing condylar spurring and demineralization. . Distal left fibular fracture is noted extending in the    lateral malleolus                 Assessment/Plan:    Active Hospital Problems Diagnosis Date Noted    Tibia/fibula fracture, right, open type I or II, initial encounter [S82.201B, S82.401B] 12/29/2022     Priority: Medium    Sinus tachycardia [R00.0] 12/29/2022     Priority: Medium    Closed fracture of neck of right humerus [S42.211A] 12/29/2022     Priority: Medium    Morbid obesity ([facility] [address] [E66.01] 12/29/2022     Priority: Medium     Comminuted oblique fracture distal fibula and lateral malleolus extending into the syndesmosis of the ankle without fracture of the posterior or medial malleolus. There was diffuse soft tissue swelling noted. -Pain medication, anti-inflammatory medications, weightbearing is as tolerated per Ortho     Right shoulder impacted fracture right humeral neck and humeral head. Kenji Felipe was consulted, recommended reverse total shoulder, left ankle fracture would need to be in cast boot and weightbearing as tolerated. S/p type and screen, check INR  cardiomegaly on x-ray, EKK without acute abnormalities  RIGHT SHOULDER TOTAL ARTHROPLASTY REVERSE by Dr. Tashia Villarreal, EBL < 100 mL     Leukocytosis, likely reactive, no signs of infection, UA is clear, sinus tachycardia likely due to anxiety and volume depletion     Sinus tachycardia, Start shortly after presented to the ED, had Fuentes catheter placed in the emergency room, after placement to was noted to be tachycardic heart rate 130s to 140s, telemetry was reviewed and appeared sinus tachycardia. Encourage PO intake, dc IVF     Morbid obesity Body mass index is 57.69 kg/m². - Complicating assessment and treatment. Placing patient at risk for multiple co-morbidities as well as early death and contributing to the patient's presentation.  on weight loss when appropriate. DVT Prophylaxis: SCDs, start pharmacological Px when ok with ortho  Diet: ADULT DIET; Regular  Code Status: Full Code    PT/OT Eval Status: Patient requires skilled rehab.     Dispo - continue inpatient care, anticipate discharge in 1 to 2 days pending SNF placement arrangement.     Boy Fragoso MD  Internal Medicine Hospitalist

## 2023-01-01 NOTE — PROGRESS NOTES
Physical Therapy  [facility]: [facility]  Physical Therapy Initial Assessment    Name: [facility]  : 1945  MRN: [id number]  Date of Service: 2023    Discharge Recommendations:  [facility] scored a 6/24 on the AM-PAC short mobility form. Current research shows that an AM-PAC score of 17 or less is typically not associated with a discharge to the patient's home setting. Based on the patient's AM-PAC score and their current functional mobility deficits, it is recommended that the patient have 3-5 sessions per week of Physical Therapy at d/c to increase the patient's independence. Please see assessment section for further patient specific details. If patient discharges prior to next session this note will serve as a discharge summary. Please see below for the latest assessment towards goals. PT Equipment Recommendations  Other: defer      Patient Diagnosis(es): The primary encounter diagnosis was Fall from standing, initial encounter. Diagnoses of Fx humeral neck, right, closed, initial encounter and Closed fracture of distal end of left fibula, unspecified fracture morphology, initial encounter were also pertinent to this visit. Past Medical History:  has a past medical history of Arthritis, Blood transfusion, Hyperlipidemia, Hypertension, Hypothyroid, and Rheumatic fever. Past Surgical History:  has a past surgical history that includes Bunionectomy () and Cholecystectomy (). Assessment   Body Structures, Functions, Activity Limitations Requiring Skilled Therapeutic Intervention: Decreased functional mobility ; Decreased ROM; Decreased strength;Decreased safe awareness;Decreased endurance;Decreased balance; Increased pain  Assessment: Pt is a 69 y.o. female s/p R reverse TSR and broken L tib/fib presenting with decreased functional mobility.   Pt is currently requiring assist of 2-3 skilled therapists for all functional mobility, which is a decline from reported baseline. Pt extremely anxious throughout session, repeatedly asking why she has to have her RUE in appropriate sling position and why she can't \"just stand\", despite being unable to clear bottom when attempting to perform partial stand from EOB. Safety concerns for return home due to heavy assist of 2-3 for all mobility, and pt being unable to transfer or ambulate without lift equipment, making her a high fall risk. Pt will benefit from skilled therapy to maximize safety and independence. Treatment Diagnosis: decreased functional mobility s/p R reverse TSR and L tib/fib fx  Therapy Prognosis: Fair  Decision Making: Medium Complexity  Requires PT Follow-Up: Yes  Activity Tolerance  Activity Tolerance: Patient limited by fatigue;Patient limited by pain; Patient limited by endurance     Plan   Physcial Therapy Plan  General Plan:  (5-7)  Current Treatment Recommendations: Strengthening, ROM, Balance training, Functional mobility training, Transfer training, Endurance training, Gait training, Stair training, Neuromuscular re-education, Home exercise program, Safety education & training, Patient/Caregiver education & training, Equipment evaluation, education, & procurement, Therapeutic activities  Safety Devices  Type of Devices: All fall risk precautions in place, Bed alarm in place, Call light within reach, Gait belt, Left in bed, Nurse notified     Restrictions  Position Activity Restriction  Other position/activity restrictions: per surgeon Dr Smith Place  in room with therapy currently, pt is okay with standard sling not shoulder immobilizer, okay to use short boot 2/2 pt height rather than tall boot. Subjective   General  Chart Reviewed: Yes  Patient assessed for rehabilitation services?: Yes  Additional Pertinent Hx: Pt is a 69 y.o. female admitted to [facility] on 12/29/22 s/p fall. XR chest: neg. XR L ankle: fx distal tib/fib/lateral malleolus. Ct head: neg.  12/31/22 s/p R reverse TSR.   PMH: arthritis, HLD, HTN, hypothyroid. Family / Caregiver Present: No  Referring Practitioner: Merissa Heredia MD  Diagnosis: fall from standing  Follows Commands: Within Functional Limits  General Comment  Comments: Pt found supine in bed upon PT arrival.  Pt agreeable to therapy session. Pt anxious throughout session, repeatedly askinig why she has to have her arm in appropriate sling position. Subjective  Subjective: \"I can't live with my arm like this. Why can't it hang down like it was before? \"         Social/Functional History  Social/Functional History  Lives With: Alone (son lives close by and can assist occ.)  Type of Home: House  Home Layout: Performs ADL's on one level, Able to Live on Main level with bedroom/bathroom, Two level  Home Access: Stairs to enter with rails  Entrance Stairs - Number of Steps: 3  Bathroom Shower/Tub: Tub/Shower unit  Bathroom Equipment: Grab bars in shower  Home Equipment: raegan Marte  Has the patient had two or more falls in the past year or any fall with injury in the past year?: Yes  ADL Assistance: [address] [place] [address] Avenue: Needs assistance (grocery delivery, son does laundry)  Ambulation Assistance: Independent (cane outside of home)  Transfer Assistance: Independent  Active : No  Vision/Hearing  Vision  Vision: Within Functional Limits  Hearing  Hearing: Within functional limits    Cognition   Orientation  Overall Orientation Status: Within Functional Limits  Cognition  Overall Cognitive Status: WFL  Cognition Comment: anxious, needing encouragement     Objective   Heart Rate: (!) 102  Heart Rate Source: Monitor  BP: (!) 128/58  BP Location: Left upper arm  BP Method: Automatic  Patient Position: Semi fowlers  MAP (Calculated): 81  Resp: 18  SpO2: 95 %  O2 Device: None (Room air)     Observation/Palpation  Observation: L eye bruising, shoulder sling adjusted to appropriate position upon arrival (was found loose and not supporting RUE)        AROM RLE (degrees)  RLE AROM: WFL  AROM LLE (degrees)  LLE AROM : WFL  Strength RLE  Comment: unable to formally assess as pt begins to slide off EOB upon sitting, she was able to perform partial ankle pump, LAQ, and minimal march EOB  Strength LLE  Comment: unable to formally assess as pt begins to slide off EOB upon sitting, she was able to perform partial ankle pump, LAQ, and minimal march EOB        Bed Mobility Training  Bed Mobility Training: Yes  Supine to Sit: Maximum assistance;Assist X2  Sit to Supine: Total assistance (Total assist x3)  Balance  Sitting: With support (EOB 20-25 min SBA static sitting, to Mod/Max with dynamic attempts at scooting pt with posterior lean sliding forward)  Standing:  (unable)  Transfer Training  Transfer Training: No (unable)  Bed mobility  Supine to Sit: Dependent/Total (MaxAx2, HOB elevated, cues for hand placement, increased time, effortful, assist for BLE and trunk placement)  Sit to Supine: Dependent/Total (totalAx3, HOB flat, assist for BLE and trunk placement, pt not receptive to cues to avoid leaning back as this was causing her to slide forward, pt resistant to assist due to pain)  Scooting: Dependent/Total;Moderate assistance (ModA to scoot laterally along EOB, minimal movement acheived. TotalAx3 to scoot up in bed in supine for repositioning with bed in trendelenberg.)  Bed Mobility Comments: frequent cues to avoid leaning back as pt begins to slide off EOB  Transfers  Sit to Stand:  (pt unable to clear bottom from EOB in attempt to perform partial stand despite MaxAx2)        Balance  Comments: 25 min spent sitting EOB during session. SBA-CGA for static sit with LUE support, cues to avoid leaning posteriorly to avoid further sliding off EOB. Mod-MaxA for dynamic sitting balance EOB, pt again with posterior lean. PT blocking pt's B knees to avoid her sliding off EOB, pt screaming in pain when knees are touched.   PT educating pt on necessity of blocking knees to avoid a fall, pt verbalized understanding. OutComes Score                                                  AM-PAC Score  AM-PAC Inpatient Mobility Raw Score : 6 (01/01/23 1517)  AM-PAC Inpatient T-Scale Score : 23.55 (01/01/23 1517)  Mobility Inpatient CMS 0-100% Score: 100 (01/01/23 1517)  Mobility Inpatient CMS G-Code Modifier : CN (01/01/23 1517)          Tinneti Score       Goals  Short Term Goals  Time Frame for Short Term Goals: Discharge  Short Term Goal 1: Pt will perform all bed mobility with ModAx2  Short Term Goal 2: Pt will perform sit to/from stand with MaxAx2  Short Term Goal 3: Pt will tolerate gait assessment if appropriate  Patient Goals   Patient Goals : None stated       Education  Patient Education  Education Given To: Patient  Education Provided: Role of Therapy;Plan of Care  Education Method: Verbal  Barriers to Learning:  (anxiety)  Education Outcome: Verbalized understanding  Patient educated on shoulder sling; including purpose, donning/doffing, fit/positioning, and wear schedule as ordered by surgical team. Patient verbalized and performed return demonstration, confirming understanding. Therapy Time   Individual Concurrent Group Co-treatment   Time In 1300         Time Out 1410         Minutes 70             Timed Code Treatment Minutes:   55    Total Treatment Minutes:  [address] [place]   This note to serve as discharge summary if patient discharged before next session.

## 2023-01-01 NOTE — PROGRESS NOTES
Department of Orthopedic Surgery  Attending Progress Note      SUBJECTIVE  no new c/o. Pain R shoulder. Nerve block \"worn off\". Difficulty with mobilization    OBJECTIVE    Physical    VITALS:  /60   Pulse (!) 102   Temp 98.4 °F (36.9 °C) (Oral)   Resp 18   Ht 5' (1.524 m)   Wt (!) 305 lb 12.5 oz (138.7 kg)   SpO2 94%   BMI 59.72 kg/m²   CONSTITUTIONAL:  awake, alert, cooperative, no apparent distress, and appears stated age  R shoulder dressing C/D/I  +NV intact  No changes ankle/ffo  Data    CBC:   Lab Results   Component Value Date/Time    WBC 8.5 12/30/2022 06:49 AM    RBC 3.24 12/30/2022 06:49 AM    HGB 10.6 12/30/2022 06:49 AM    HCT 31.0 12/30/2022 06:49 AM    MCV 95.6 12/30/2022 06:49 AM    MCH 32.6 12/30/2022 06:49 AM    MCHC 34.1 12/30/2022 06:49 AM    RDW 12.8 12/30/2022 06:49 AM     12/30/2022 06:49 AM    MPV 8.1 12/30/2022 06:49 AM     ASSESSMENT AND PLAN      POD #1 R reverse total shoulder for fracture  Closed treatment L ankle fracture, L great toe fx    Has cast boot but too large (given small stature), will try shorter cast boot.   When available she can be WBAT for transfers  Sling/ shoulder immobilizer can be removed for OT/PT, skin care  [facility] referral  F/u in office in approximately 4-5 weeks post discharge  R shoulder incision has absorbable suture and can be open to air in 10 days

## 2023-01-02 PROCEDURE — 2500000003 HC RX 250 WO HCPCS: Performed by: INTERNAL MEDICINE

## 2023-01-02 PROCEDURE — A4216 STERILE WATER/SALINE, 10 ML: HCPCS | Performed by: INTERNAL MEDICINE

## 2023-01-02 PROCEDURE — 6370000000 HC RX 637 (ALT 250 FOR IP): Performed by: INTERNAL MEDICINE

## 2023-01-02 PROCEDURE — 6360000002 HC RX W HCPCS: Performed by: INTERNAL MEDICINE

## 2023-01-02 PROCEDURE — 2580000003 HC RX 258: Performed by: INTERNAL MEDICINE

## 2023-01-02 PROCEDURE — 2580000003 HC RX 258: Performed by: ORTHOPAEDIC SURGERY

## 2023-01-02 PROCEDURE — 1200000000 HC SEMI PRIVATE

## 2023-01-02 PROCEDURE — 6370000000 HC RX 637 (ALT 250 FOR IP): Performed by: ORTHOPAEDIC SURGERY

## 2023-01-02 RX ORDER — FAMOTIDINE 20 MG/1
20 TABLET, FILM COATED ORAL 2 TIMES DAILY
Status: DISCONTINUED | OUTPATIENT
Start: 2023-01-02 | End: 2023-01-03 | Stop reason: HOSPADM

## 2023-01-02 RX ORDER — FAMOTIDINE 20 MG/1
20 TABLET, FILM COATED ORAL 2 TIMES DAILY
Qty: 60 TABLET | Refills: 3 | Status: SHIPPED | OUTPATIENT
Start: 2023-01-02

## 2023-01-02 RX ORDER — CHOLECALCIFEROL (VITAMIN D3) 50 MCG
2000 TABLET ORAL DAILY
Qty: 30 TABLET | Refills: 5 | Status: SHIPPED | OUTPATIENT
Start: 2023-01-02

## 2023-01-02 RX ORDER — PSEUDOEPHEDRINE HCL 30 MG
100 TABLET ORAL DAILY
Qty: 30 CAPSULE | Refills: 2 | Status: SHIPPED | OUTPATIENT
Start: 2023-01-02

## 2023-01-02 RX ADMIN — APIXABAN 2.5 MG: 2.5 TABLET, FILM COATED ORAL at 13:49

## 2023-01-02 RX ADMIN — OXYCODONE AND ACETAMINOPHEN 2 TABLET: 5; 325 TABLET ORAL at 02:18

## 2023-01-02 RX ADMIN — ACETAMINOPHEN 650 MG: 325 TABLET ORAL at 11:53

## 2023-01-02 RX ADMIN — MICONAZOLE NITRATE: 20 POWDER TOPICAL at 20:10

## 2023-01-02 RX ADMIN — FAMOTIDINE 20 MG: 20 TABLET ORAL at 10:47

## 2023-01-02 RX ADMIN — OXYCODONE AND ACETAMINOPHEN 1 TABLET: 5; 325 TABLET ORAL at 21:38

## 2023-01-02 RX ADMIN — MICONAZOLE NITRATE: 20 POWDER TOPICAL at 08:01

## 2023-01-02 RX ADMIN — FAMOTIDINE 20 MG: 10 INJECTION, SOLUTION INTRAVENOUS at 08:01

## 2023-01-02 RX ADMIN — APIXABAN 2.5 MG: 2.5 TABLET, FILM COATED ORAL at 20:08

## 2023-01-02 RX ADMIN — LEVOTHYROXINE SODIUM 175 MCG: 0.17 TABLET ORAL at 06:39

## 2023-01-02 RX ADMIN — OXYCODONE AND ACETAMINOPHEN 1 TABLET: 5; 325 TABLET ORAL at 10:48

## 2023-01-02 RX ADMIN — SODIUM CHLORIDE, PRESERVATIVE FREE 10 ML: 5 INJECTION INTRAVENOUS at 20:08

## 2023-01-02 RX ADMIN — OXYCODONE AND ACETAMINOPHEN 1 TABLET: 5; 325 TABLET ORAL at 17:10

## 2023-01-02 RX ADMIN — MORPHINE SULFATE 4 MG: 2 INJECTION, SOLUTION INTRAMUSCULAR; INTRAVENOUS at 06:39

## 2023-01-02 RX ADMIN — FAMOTIDINE 20 MG: 20 TABLET ORAL at 20:09

## 2023-01-02 ASSESSMENT — PAIN DESCRIPTION - ONSET: ONSET: ON-GOING

## 2023-01-02 ASSESSMENT — PAIN - FUNCTIONAL ASSESSMENT
PAIN_FUNCTIONAL_ASSESSMENT: PREVENTS OR INTERFERES WITH MANY ACTIVE NOT PASSIVE ACTIVITIES
PAIN_FUNCTIONAL_ASSESSMENT: PREVENTS OR INTERFERES SOME ACTIVE ACTIVITIES AND ADLS

## 2023-01-02 ASSESSMENT — PAIN DESCRIPTION - PAIN TYPE: TYPE: SURGICAL PAIN

## 2023-01-02 ASSESSMENT — PAIN DESCRIPTION - DESCRIPTORS
DESCRIPTORS: DISCOMFORT
DESCRIPTORS: DISCOMFORT
DESCRIPTORS: ACHING
DESCRIPTORS: ACHING;DISCOMFORT
DESCRIPTORS: ACHING

## 2023-01-02 ASSESSMENT — PAIN DESCRIPTION - ORIENTATION
ORIENTATION: RIGHT
ORIENTATION: RIGHT
ORIENTATION: RIGHT;LEFT
ORIENTATION: RIGHT
ORIENTATION: RIGHT

## 2023-01-02 ASSESSMENT — PAIN SCALES - GENERAL
PAINLEVEL_OUTOF10: 5
PAINLEVEL_OUTOF10: 5
PAINLEVEL_OUTOF10: 7
PAINLEVEL_OUTOF10: 5
PAINLEVEL_OUTOF10: 6

## 2023-01-02 ASSESSMENT — PAIN DESCRIPTION - LOCATION
LOCATION: ARM
LOCATION: SHOULDER
LOCATION: FOOT;ARM
LOCATION: LEG;SHOULDER
LOCATION: SHOULDER

## 2023-01-02 ASSESSMENT — PAIN DESCRIPTION - FREQUENCY: FREQUENCY: CONTINUOUS

## 2023-01-02 NOTE — PROGRESS NOTES
PT's son called this RN with concerns of discharge planning. This RN attempted to contact  but no answer at this time.

## 2023-01-02 NOTE — CARE COORDINATION
Cm met with pt at bedside. Pt was not aware she had a dc order. CM reviewed list of SNF's with pt. She would like to speak to her son prior to making that decision. She stated she would like to go closer to his home which is in [place]. [place]. CM spoke with Norbert Sorensen [phone]. Explained referral process for SNF. They would like referral to [facility]. At this time, he does not have a second choice. Norbert Sorensen stated he has Covid so he will need to be reached by telephone. Referral faxed to [facility]. C, called Shanelle at [facility].  Left message regarding referral.

## 2023-01-02 NOTE — PROGRESS NOTES
Report called to [facility] to [facility]. Belongings placed with pt and transport via stretcher. Pt A/Ox3 to self, place and situation. PIV SL VSS.

## 2023-01-02 NOTE — PLAN OF CARE
Safety - Adult  Goal: Free from fall injury  Outcome: Progressing     Pain  Goal: Verbalizes/displays adequate comfort level or baseline comfort level  Outcome: Progressing     Skin/Tissue Integrity  Goal: Absence of new skin breakdown  Outcome: Progressing  Note: Utilizing miconazole powder for redness in abdominal folds.

## 2023-01-02 NOTE — PROGRESS NOTES
Ms. Sachin Rice has been alert and oriented this shift. She continues to have pain in her R shoulder that is being managed with medication. She requests having her arm repositioned frequently. She is voiding adequately and states her last BM was yesterday, 1/01. Fall precautions are in place.

## 2023-01-02 NOTE — PLAN OF CARE
Problem: Safety - Adult  Goal: Free from fall injury  1/1/2023 2222 by Jigna De Dios RN  Outcome: Progressing   Pt free from injury this shift and free of falls. 3/4 rails up on bed and bed is in the lowest position. Wheels locked and bed alarm set. Socks on pt and ID bands on pt. Call light in reach of pt and pt educated to call out to get up x2 maxilift. Will continue to monitor for safety. Problem: Pain  Goal: Verbalizes/displays adequate comfort level or baseline comfort level  1/1/2023 2222 by Jigna De Dios RN  Outcome: Progressing   Controlled with PO, ice, repositioning.

## 2023-01-02 NOTE — DISCHARGE SUMMARY
Hospital Medicine Discharge Summary    Patient ID: Derek Duran      Patient's PCP: Windy Coto MD    Admit Date: 12/29/2022     Discharge Date:   TBD, pending SNF placement    Admitting Provider: Kathryn Mejia MD     Discharge Provider: Vannesa Horowitz MD     Discharge Diagnoses: Active Hospital Problems    Diagnosis     Tibia/fibula fracture, right, open type I or II, initial encounter [S82.201B, S82.401B]      Priority: Medium    Sinus tachycardia [R00.0]      Priority: Medium    Closed fracture of neck of right humerus [S42.211A]      Priority: Medium    Morbid obesity ([facility] [address] [E66.01]      Priority: Medium       The patient was seen and examined on day of discharge and this discharge summary is in conjunction with any daily progress note from day of discharge. Hospital Course:     Derek Duran 69 y.o. female  -History of hypertension B12 deficiency hypothyroidism  -She went out to get her mail, slipped and hit her left side of the head on the mailbox and right hip on the stairs  -States her neighbor saw her and tried to get her up or unable to and EMS was called she was brought to the emergency room. -In the ED initial vitals were stable, labs showed BUN of 21 anion gap 17 WBC of 20.3 with left shift. UA was unremarkable although specific gravity greater than 1.030. Multiple imaging done included multiple imagings were done significant for comminuted oblique fracture distal fibula and lateral malleolus extending into the syndesmosis of the ankle without fracture of the posterior or medial malleolus. There was diffuse soft tissue swelling noted. Right shoulder impacted fracture right humeral neck and humeral head. Maia Green was consulted, recommended reverse total shoulder, left ankle fracture would need to be in cast boot and weightbearing as tolerated. Patient to be admitted for further management.       Diagnosis Date Noted    Tibia/fibula fracture, right, open type I or II, initial encounter [S82.201B, S82.401B] 12/29/2022       Priority: Medium    Sinus tachycardia [R00.0] 12/29/2022       Priority: Medium    Closed fracture of neck of right humerus [S42.211A] 12/29/2022       Priority: Medium    Morbid obesity ([facility] [address] [E66.01] 12/29/2022       Priority: Medium      Comminuted oblique fracture distal fibula and lateral malleolus extending into the syndesmosis of the ankle without fracture of the posterior or medial malleolus. There was diffuse soft tissue swelling noted. -Pain medication, anti-inflammatory medications, weightbearing is as tolerated per Ortho     Right shoulder impacted fracture right humeral neck and humeral head. Julia Elena was consulted, recommended reverse total shoulder, left ankle fracture would need to be in cast boot and weightbearing as tolerated. RIGHT SHOULDER TOTAL ARTHROPLASTY REVERSE by Dr. Yadi Chapa, EBL < 100 mL     Leukocytosis, likely reactive, resolved. Sinus tachycardia, at baseline. Morbid obesity Body mass index is 57.69 kg/m². - Complicating assessment and treatment. Placing patient at risk for multiple co-morbidities as well as early death and contributing to the patient's presentation.  on weight loss when appropriate. DVT Prophylaxis: Started on Eliquis 2.5 twice daily. Patient was seen and examined at bedside today, alert and oriented. Pleasant lady. No acute events reported or observed overnight. Patient did not report any new complaints. Plan to discharge to skilled nursing facility once facility has been selected when bed is available. Physical Exam Performed:     BP (!) 140/76   Pulse (!) 109   Temp 98.1 °F (36.7 °C) (Oral)   Resp 16   Ht 5' (1.524 m)   Wt (!) 311 lb 15.2 oz (141.5 kg)   SpO2 93%   BMI 60.92 kg/m²       General appearance: Morbidly obese, no apparent distress, appears stated age and cooperative. HEENT:  Normal cephalic, atraumatic without obvious deformity.  Pupils equal, round, and reactive to light. Extra ocular muscles intact. Conjunctivae/corneas clear. Neck: Supple, with full range of motion. No jugular venous distention. Trachea midline. Respiratory:  Normal respiratory effort. Clear to auscultation, bilaterally without Rales/Wheezes/Rhonchi. Cardiovascular:  Regular rate and rhythm with normal S1/S2 without murmurs, rubs or gallops. Abdomen: Soft, non-tender, non-distended with normal bowel sounds. Musculoskeletal: Right upper extremity. Skin: Skin color, texture, turgor normal.  No rashes or lesions. Neurologic:  Neurovascularly intact without any focal sensory/motor deficits. Cranial nerves: II-XII intact, grossly non-focal.  Psychiatric:  Alert and oriented, thought content appropriate, normal insight      Labs: For convenience and continuity at follow-up the following most recent labs are provided:      CBC:    Lab Results   Component Value Date/Time    WBC 8.5 12/30/2022 06:49 AM    HGB 10.6 12/30/2022 06:49 AM    HCT 31.0 12/30/2022 06:49 AM     12/30/2022 06:49 AM       Renal:    Lab Results   Component Value Date/Time     12/30/2022 06:49 AM    K 4.1 12/30/2022 06:49 AM     12/30/2022 06:49 AM    CO2 23 12/30/2022 06:49 AM    BUN 19 12/30/2022 06:49 AM    CREATININE 1.0 12/30/2022 06:49 AM    CALCIUM 9.6 12/30/2022 06:49 AM    PHOS 3.9 08/07/2012 08:29 AM         Significant Diagnostic Studies    Radiology:   XR SHOULDER RIGHT 1 VW   Final Result      Interval glenohumeral arthroplasty without complicating features. XR FOOT LEFT (MIN 3 VIEWS)   Final Result      Mildly displaced intra-articular fracture in the base of the 1st proximal phalanx. CT-3D RENDRING ON [address] [place] [address]   Final Result   1. Collapsed impacted humeral head secondary to superior displacement and impaction of the shaft with comminuted greater lesser tuberosity as described   2.  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 3. Subcoracoid loose bodies noted and a loose body posterior to the glenoid. CT SHOULDER RIGHT WO CONTRAST   Final Result   1. Collapsed impacted humeral head secondary to superior displacement and impaction of the shaft with comminuted greater lesser tuberosity as described   2. 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 3. Subcoracoid loose bodies noted and a loose body posterior to the glenoid. CT CERVICAL SPINE WO CONTRAST   Final Result      No fracture seen. Moderately advanced multilevel spondylotic change. CT HEAD WO CONTRAST   Final Result      No evidence of acute intracranial abnormality. XR CHEST PORTABLE   Final Result      Cardiomegaly. No acute pulmonary abnormality. XR HUMERUS RIGHT (MIN 2 VIEWS)   Final Result      Acute impacted fracture of the right humeral neck and humeral head         2 VIEWS OF THE LEFT HUMERUS on 12/29/2022      HISTORY: Pain and trauma. PROCEDURE:      AP and lateral  views were obtained. FINDINGS:      There is no sign of a discrete fracture-dislocation or joint effusion. There is no bony defect identified or loose body. There is no radiopaque foreign body      The AC joint and glenohumeral joint reveal degenerative changes      IMPRESSION:      No sign of any fracture or dislocation. Chronic degenerative changes of the glenohumeral and AC joint. TWO VIEWS OF THE  RIGHT SHOULDER on 12/29/2022      HISTORY: shoulder pain. PROCEDURE:      AP internal/external rotation views were obtained. FINDINGS:      There is impacted right proximal humeral neck oblique fracture and comminuted humeral head fracture with multiple fragments and lateral positioning of the distal humerus in relation humeral head. There is AC joint hypertrophy. No sign of any dislocation.       IMPRESSION:      Comminuted and impacted right humeral neck and head fracture, without gross dislocation      LEFT TIBIA/FIBULA 2 VIEWS on 12/29/2022      HISTORY: Pain. Fell      PROCEDURE:      AP and lateral views were obtained, including the knee and ankle joints. FINDINGS:      There is normal alignment of the proximal and mid tibia and fibula. There is distal oblique fibular and lateral malleolus fracture. The knee is severely arthritic with severe medial lateral compartment narrowing and condylar spurring. There is no    radiopaque foreign body. IMPRESSION:      Normal radiographs of the proximal left tibia and fibula. Advanced arthritic changes noted at the knee with severe medial and lateral compartment narrowing condylar spurring and demineralization. . Distal left fibular fracture is noted extending in the    lateral malleolus         XR HUMERUS LEFT (MIN 2 VIEWS)   Final Result      Acute impacted fracture of the right humeral neck and humeral head         2 VIEWS OF THE LEFT HUMERUS on 12/29/2022      HISTORY: Pain and trauma. PROCEDURE:      AP and lateral  views were obtained. FINDINGS:      There is no sign of a discrete fracture-dislocation or joint effusion. There is no bony defect identified or loose body. There is no radiopaque foreign body      The AC joint and glenohumeral joint reveal degenerative changes      IMPRESSION:      No sign of any fracture or dislocation. Chronic degenerative changes of the glenohumeral and AC joint. TWO VIEWS OF THE  RIGHT SHOULDER on 12/29/2022      HISTORY: shoulder pain. PROCEDURE:      AP internal/external rotation views were obtained. FINDINGS:      There is impacted right proximal humeral neck oblique fracture and comminuted humeral head fracture with multiple fragments and lateral positioning of the distal humerus in relation humeral head. There is AC joint hypertrophy. No sign of any dislocation.       IMPRESSION:      Comminuted and impacted right humeral neck and head fracture, without gross dislocation      LEFT TIBIA/FIBULA 2 VIEWS on 12/29/2022      HISTORY: Pain. Fell      PROCEDURE:      AP and lateral views were obtained, including the knee and ankle joints. FINDINGS:      There is normal alignment of the proximal and mid tibia and fibula. There is distal oblique fibular and lateral malleolus fracture. The knee is severely arthritic with severe medial lateral compartment narrowing and condylar spurring. There is no    radiopaque foreign body. IMPRESSION:      Normal radiographs of the proximal left tibia and fibula. Advanced arthritic changes noted at the knee with severe medial and lateral compartment narrowing condylar spurring and demineralization. . Distal left fibular fracture is noted extending in the    lateral malleolus         XR SHOULDER RIGHT (MIN 2 VIEWS)   Final Result      Acute impacted fracture of the right humeral neck and humeral head         2 VIEWS OF THE LEFT HUMERUS on 12/29/2022      HISTORY: Pain and trauma. PROCEDURE:      AP and lateral  views were obtained. FINDINGS:      There is no sign of a discrete fracture-dislocation or joint effusion. There is no bony defect identified or loose body. There is no radiopaque foreign body      The AC joint and glenohumeral joint reveal degenerative changes      IMPRESSION:      No sign of any fracture or dislocation. Chronic degenerative changes of the glenohumeral and AC joint. TWO VIEWS OF THE  RIGHT SHOULDER on 12/29/2022      HISTORY: shoulder pain. PROCEDURE:      AP internal/external rotation views were obtained. FINDINGS:      There is impacted right proximal humeral neck oblique fracture and comminuted humeral head fracture with multiple fragments and lateral positioning of the distal humerus in relation humeral head. There is AC joint hypertrophy. No sign of any dislocation.       IMPRESSION:      Comminuted and impacted right humeral neck and head fracture, without gross dislocation      LEFT TIBIA/FIBULA 2 VIEWS on 12/29/2022      HISTORY: Pain. Fell      PROCEDURE:      AP and lateral views were obtained, including the knee and ankle joints. FINDINGS:      There is normal alignment of the proximal and mid tibia and fibula. There is distal oblique fibular and lateral malleolus fracture. The knee is severely arthritic with severe medial lateral compartment narrowing and condylar spurring. There is no    radiopaque foreign body. IMPRESSION:      Normal radiographs of the proximal left tibia and fibula. Advanced arthritic changes noted at the knee with severe medial and lateral compartment narrowing condylar spurring and demineralization. . Distal left fibular fracture is noted extending in the    lateral malleolus         XR ANKLE LEFT (MIN 3 VIEWS)   Final Result      Comminuted oblique fracture of the distal fibula and lateral malleolus extending into the syndesmosis of the ankle without discrete fracture of the posterior or medial malleolus at this time. Diffuse soft tissue swelling noted         XR TIBIA FIBULA LEFT (2 VIEWS)   Final Result      Acute impacted fracture of the right humeral neck and humeral head         2 VIEWS OF THE LEFT HUMERUS on 12/29/2022      HISTORY: Pain and trauma. PROCEDURE:      AP and lateral  views were obtained. FINDINGS:      There is no sign of a discrete fracture-dislocation or joint effusion. There is no bony defect identified or loose body. There is no radiopaque foreign body      The AC joint and glenohumeral joint reveal degenerative changes      IMPRESSION:      No sign of any fracture or dislocation. Chronic degenerative changes of the glenohumeral and AC joint. TWO VIEWS OF THE  RIGHT SHOULDER on 12/29/2022      HISTORY: shoulder pain. PROCEDURE:      AP internal/external rotation views were obtained.       FINDINGS:      There is impacted right proximal humeral neck oblique fracture and comminuted humeral head fracture with multiple fragments and lateral positioning of the distal humerus in relation humeral head. There is AC joint hypertrophy. No sign of any dislocation. IMPRESSION:      Comminuted and impacted right humeral neck and head fracture, without gross dislocation      LEFT TIBIA/FIBULA 2 VIEWS on 12/29/2022      HISTORY: Pain. Fell      PROCEDURE:      AP and lateral views were obtained, including the knee and ankle joints. FINDINGS:      There is normal alignment of the proximal and mid tibia and fibula. There is distal oblique fibular and lateral malleolus fracture. The knee is severely arthritic with severe medial lateral compartment narrowing and condylar spurring. There is no    radiopaque foreign body. IMPRESSION:      Normal radiographs of the proximal left tibia and fibula. Advanced arthritic changes noted at the knee with severe medial and lateral compartment narrowing condylar spurring and demineralization. . Distal left fibular fracture is noted extending in the    lateral malleolus                Consults:     IP CONSULT TO HOSPITALIST  IP CONSULT TO ORTHOPEDIC SURGERY  IP CONSULT TO SOCIAL WORK    Disposition:  SNF     Condition at Discharge: Stable    Discharge Instructions/Follow-up:    As per Discharge paperwork/instructions    Code Status:  Full Code     Activity: activity as tolerated    Diet: regular diet      Discharge Medications:     Current Discharge Medication List             Details   famotidine (PEPCID) 20 MG tablet Take 1 tablet by mouth 2 times daily  Qty: 60 tablet, Refills: 3      apixaban (ELIQUIS) 2.5 MG TABS tablet Take 1 tablet by mouth 2 times daily for 67 doses  Qty: 60 tablet, Refills: 0                Details   docusate (COLACE, DULCOLAX) 100 MG CAPS Take 100 mg by mouth daily  Qty: 30 capsule, Refills: 2      vitamin D (CHOLECALCIFEROL) 50 MCG (2000 UT) TABS tablet Take 1 tablet by mouth daily  Qty: 30 tablet, Refills: 5 Details   meloxicam (MOBIC) 15 MG tablet TAKE ONE TABLET BY MOUTH EVERY DAY  Qty: 30 tablet, Refills: 5      levothyroxine (SYNTHROID) 175 MCG tablet Take 1 tablet by mouth  daily. Take additional 1/2  tablet by mouth on Monday  and Wednesday  Qty: 103 tablet, Refills: 5      lisinopril-hydrochlorothiazide (PRINZIDE;ZESTORETIC) 20-25 MG per tablet Take 0.5 tablets by mouth daily. cyanocobalamin 1000 MCG/ML injection Inject 1 mL into the skin once for 1 dose. Qty: 1 mL, Refills: 4    Comments: Take daily for 5 days,then weekly for 4 weeks,then monthly      multivitamin (THERAGRAN) per tablet Take 1 tablet by mouth daily. Qty: 30 tablet, Refills: 4             Time Spent on discharge is more than 45 minutes in the examination, evaluation, counseling and review of medications and discharge plan.       Signed:    Ian Samuel MD   1/2/2023

## 2023-01-02 NOTE — DISCHARGE INSTR - COC
Continuity of Care Form    Patient Name: Jewels Raygoza   :  1945  MRN:  [id number]    [facility] date:  2022  Discharge date:  ***    Code Status Order: Full Code   Advance Directives:   Advance Care Flowsheet Documentation       Date/Time Healthcare Directive Type of Healthcare Directive Copy in [address] Agent's Name Healthcare Agent's Phone Number    [phone] Yes, patient has an advance directive for healthcare treatment -- -- -- -- --            Admitting Physician:  Kurtis Manuel MD  PCP: Nancy Rodriguez MD    Discharging Nurse: [facility] Unit/Room#: 0373/0430-74  Discharging Unit Phone Number: ***    Emergency Contact:   Extended Emergency Contact Information  Primary Emergency Contact: Severiano Avina   [address] Phone: [phone]  Relation: Child    Past Surgical History:  Past Surgical History:   Procedure Laterality Date    [address]       Immunization History:   Immunization History   Administered Date(s) Administered    COVID-19, MODERNA BLUE border, Primary or Immunocompromised, (age 12y+), IM, 100 mcg/0.5mL 2021    Influenza 2012, 10/01/2013    Influenza, High Dose (Fluzone 65 yrs and older) 2014    Pneumococcal Polysaccharide ([id number]) 2012    Tdap (Boostrix, Adacel) 2022       Active Problems:  Patient Active Problem List   Diagnosis Code    HTN (hypertension) I10    Hypothyroid E03.9    Arthritis of knee M17.10    Sleep apnea G47.30    Murmur R01.1    Allergic rhinitis J30.9    RAD (reactive airway disease) J45.909    Anemia D64.9    Rectal bleeding K62.5    Tibia/fibula fracture, right, open type I or II, initial encounter S82.201B, S82.401B    Sinus tachycardia R00.0    Closed fracture of neck of right humerus S42.211A    Morbid obesity ([facility] [address] E66.01       Isolation/Infection:   Isolation            No Isolation          Patient Infection Status       None to display            Nurse Assessment:  Last Vital Signs: BP (!) 159/95   Pulse (!) 101   Temp 97.8 °F (36.6 °C) (Axillary)   Resp 16   Ht 5' (1.524 m)   Wt (!) 311 lb 15.2 oz (141.5 kg)   SpO2 94%   BMI 60.92 kg/m²     Last documented pain score (0-10 scale): Pain Level: 7  Last Weight:   Wt Readings from Last 1 Encounters:   01/02/23 (!) 311 lb 15.2 oz (141.5 kg)     Mental Status:  oriented and alert    IV Access:  - None    Nursing Mobility/ADLs:  Walking   Assisted  Transfer  Assisted  Bathing  Assisted  Dressing  Assisted  Toileting  Assisted  Feeding  Independent, set up  Med Admin  Assisted  Med Delivery   whole    Wound Care Documentation and Therapy:  Incision 12/31/22 Axilla Right (Active)   Dressing Status Clean;Dry; Intact 01/02/23 0300   Incision Cleansed Not Cleansed 01/02/23 0300   Dressing/Treatment Dry dressing; Foam 01/02/23 0300   Closure Other (Comment) 01/02/23 0300   Drainage Amount None 01/02/23 0300   Odor None 01/02/23 0300   Number of days: 1        Elimination:  Continence: Bowel: Yes  Bladder: Yes  Urinary Catheter: None   Colostomy/Ileostomy/Ileal Conduit: No       Date of Last BM: 1/2/23    Intake/Output Summary (Last 24 hours) at 1/2/2023 0857  Last data filed at 1/2/2023 2711  Gross per 24 hour   Intake 600 ml   Output 100 ml   Net 500 ml     I/O last 3 completed shifts: In: 1200 [P.O.:1200]  [facility]    Safety Concerns:     History of Falls (last 30 days)    Impairments/Disabilities:      Vision    Nutrition Therapy:  Current Nutrition Therapy:   - Oral Diet:  General    Routes of Feeding: Oral  Liquids: Thin Liquids  Daily Fluid Restriction: no  Last Modified Barium Swallow with Video (Video Swallowing Test): not done    Treatments at the Time of Hospital Discharge:   Respiratory Treatments:   Oxygen Therapy:  is not on home oxygen therapy.   Ventilator:    - No ventilator support    Rehab Therapies: Physical Therapy and Occupational Therapy  Weight Bearing Status/Restrictions: No weight bearing restrictions  Other Medical Equipment (for information only, NOT a DME order):  wheelchair  Other Treatments:     Patient's personal belongings (please select all that are sent with patient):  None    RN SIGNATURE:  Electronically signed by Naz Grant RN on 1/3/23 at 1:18 PM EST    CASE MANAGEMENT/SOCIAL WORK SECTION    Inpatient Status Date: 12/29/22    Readmission Risk Assessment Score:  Readmission Risk              Risk of Unplanned Readmission:  7           Discharging to Facility/ Agency   [address]        Address   [address] [facility]             Dialysis [facility] (if applicable)   Name:  Address:  Dialysis Schedule:  Phone:  Fax:    / signature: Electronically signed by PEBBLES Dumont, ZEKEW on 1/3/23 at 1:43 PM EST    PHYSICIAN SECTION    Prognosis: Good    Condition at Discharge: Stable    Rehab Potential (if transferring to Rehab): Good    Recommended Labs or Other Treatments After Discharge:   Cast boot. When available she can be WBAT for transfers. Sling/ shoulder immobilizer can be removed for OT/PT, skin care  F/u in orthopedic office in approximately 4-5 weeks post discharge  R shoulder incision has absorbable suture and can be open to air in 10 days    Physician Certification: I certify the above information and transfer of Janelle Alonso  is necessary for the continuing treatment of the diagnosis listed and that she requires [facility] for greater 30 days.      Update Admission H&P: No change in H&P    PHYSICIAN SIGNATURE:  Electronically signed by Kayleen Galeas MD on 1/2/23 at 8:58 AM EST

## 2023-01-03 VITALS
HEART RATE: 101 BPM | BODY MASS INDEX: 57.52 KG/M2 | HEIGHT: 60 IN | TEMPERATURE: 98.4 F | DIASTOLIC BLOOD PRESSURE: 78 MMHG | RESPIRATION RATE: 16 BRPM | SYSTOLIC BLOOD PRESSURE: 155 MMHG | OXYGEN SATURATION: 96 % | WEIGHT: 293 LBS

## 2023-01-03 LAB
A/G RATIO: 1.3 (ref 1.1–2.2)
ALBUMIN SERPL-MCNC: 3.4 G/DL (ref 3.4–5)
ALP BLD-CCNC: 65 U/L (ref 40–129)
ALT SERPL-CCNC: 7 U/L (ref 10–40)
ANION GAP SERPL CALCULATED.3IONS-SCNC: 11 MMOL/L (ref 3–16)
AST SERPL-CCNC: 27 U/L (ref 15–37)
BASOPHILS ABSOLUTE: 0 K/UL (ref 0–0.2)
BASOPHILS RELATIVE PERCENT: 0.5 %
BILIRUB SERPL-MCNC: 0.5 MG/DL (ref 0–1)
BUN BLDV-MCNC: 19 MG/DL (ref 7–20)
CALCIUM SERPL-MCNC: 9.3 MG/DL (ref 8.3–10.6)
CHLORIDE BLD-SCNC: 97 MMOL/L (ref 99–110)
CO2: 28 MMOL/L (ref 21–32)
CREAT SERPL-MCNC: 0.9 MG/DL (ref 0.6–1.2)
EOSINOPHILS ABSOLUTE: 0.2 K/UL (ref 0–0.6)
EOSINOPHILS RELATIVE PERCENT: 2.9 %
GFR SERPL CREATININE-BSD FRML MDRD: >60 ML/MIN/{1.73_M2}
GLUCOSE BLD-MCNC: 133 MG/DL (ref 70–99)
HCT VFR BLD CALC: 28.3 % (ref 36–48)
HEMOGLOBIN: 9.6 G/DL (ref 12–16)
LYMPHOCYTES ABSOLUTE: 1.3 K/UL (ref 1–5.1)
LYMPHOCYTES RELATIVE PERCENT: 15.6 %
MAGNESIUM: 1.3 MG/DL (ref 1.8–2.4)
MCH RBC QN AUTO: 32.3 PG (ref 26–34)
MCHC RBC AUTO-ENTMCNC: 33.8 G/DL (ref 31–36)
MCV RBC AUTO: 95.5 FL (ref 80–100)
MONOCYTES ABSOLUTE: 0.8 K/UL (ref 0–1.3)
MONOCYTES RELATIVE PERCENT: 9.6 %
NEUTROPHILS ABSOLUTE: 6.1 K/UL (ref 1.7–7.7)
NEUTROPHILS RELATIVE PERCENT: 71.4 %
PDW BLD-RTO: 12.5 % (ref 12.4–15.4)
PHOSPHORUS: 2.4 MG/DL (ref 2.5–4.9)
PLATELET # BLD: 323 K/UL (ref 135–450)
PMV BLD AUTO: 7.8 FL (ref 5–10.5)
POTASSIUM SERPL-SCNC: 3.9 MMOL/L (ref 3.5–5.1)
RBC # BLD: 2.96 M/UL (ref 4–5.2)
SODIUM BLD-SCNC: 136 MMOL/L (ref 136–145)
TOTAL PROTEIN: 6 G/DL (ref 6.4–8.2)
WBC # BLD: 8.6 K/UL (ref 4–11)

## 2023-01-03 PROCEDURE — 85025 COMPLETE CBC W/AUTO DIFF WBC: CPT

## 2023-01-03 PROCEDURE — 2580000003 HC RX 258: Performed by: ORTHOPAEDIC SURGERY

## 2023-01-03 PROCEDURE — 83735 ASSAY OF MAGNESIUM: CPT

## 2023-01-03 PROCEDURE — 6370000000 HC RX 637 (ALT 250 FOR IP): Performed by: INTERNAL MEDICINE

## 2023-01-03 PROCEDURE — 6370000000 HC RX 637 (ALT 250 FOR IP): Performed by: ORTHOPAEDIC SURGERY

## 2023-01-03 PROCEDURE — 97530 THERAPEUTIC ACTIVITIES: CPT

## 2023-01-03 PROCEDURE — 84100 ASSAY OF PHOSPHORUS: CPT

## 2023-01-03 PROCEDURE — 36415 COLL VENOUS BLD VENIPUNCTURE: CPT

## 2023-01-03 PROCEDURE — 80053 COMPREHEN METABOLIC PANEL: CPT

## 2023-01-03 PROCEDURE — 97535 SELF CARE MNGMENT TRAINING: CPT

## 2023-01-03 RX ORDER — OXYCODONE HYDROCHLORIDE AND ACETAMINOPHEN 5; 325 MG/1; MG/1
1 TABLET ORAL EVERY 8 HOURS PRN
Qty: 15 TABLET | Refills: 0 | Status: SHIPPED | OUTPATIENT
Start: 2023-01-03 | End: 2023-01-08

## 2023-01-03 RX ADMIN — MICONAZOLE NITRATE: 20 POWDER TOPICAL at 09:14

## 2023-01-03 RX ADMIN — LEVOTHYROXINE SODIUM 175 MCG: 0.17 TABLET ORAL at 06:40

## 2023-01-03 RX ADMIN — FAMOTIDINE 20 MG: 20 TABLET ORAL at 09:14

## 2023-01-03 RX ADMIN — OXYCODONE AND ACETAMINOPHEN 2 TABLET: 5; 325 TABLET ORAL at 09:14

## 2023-01-03 RX ADMIN — ACETAMINOPHEN 650 MG: 325 TABLET ORAL at 06:40

## 2023-01-03 RX ADMIN — ACETAMINOPHEN 650 MG: 325 TABLET ORAL at 01:00

## 2023-01-03 RX ADMIN — APIXABAN 2.5 MG: 2.5 TABLET, FILM COATED ORAL at 09:13

## 2023-01-03 RX ADMIN — OXYCODONE AND ACETAMINOPHEN 2 TABLET: 5; 325 TABLET ORAL at 14:14

## 2023-01-03 RX ADMIN — SODIUM CHLORIDE, PRESERVATIVE FREE 5 ML: 5 INJECTION INTRAVENOUS at 09:14

## 2023-01-03 ASSESSMENT — PAIN SCALES - GENERAL
PAINLEVEL_OUTOF10: 7
PAINLEVEL_OUTOF10: 4
PAINLEVEL_OUTOF10: 7

## 2023-01-03 ASSESSMENT — PAIN - FUNCTIONAL ASSESSMENT: PAIN_FUNCTIONAL_ASSESSMENT: PREVENTS OR INTERFERES WITH MANY ACTIVE NOT PASSIVE ACTIVITIES

## 2023-01-03 ASSESSMENT — PAIN DESCRIPTION - FREQUENCY: FREQUENCY: CONTINUOUS

## 2023-01-03 ASSESSMENT — PAIN DESCRIPTION - DESCRIPTORS: DESCRIPTORS: ACHING

## 2023-01-03 ASSESSMENT — PAIN DESCRIPTION - ORIENTATION: ORIENTATION: RIGHT;LEFT

## 2023-01-03 ASSESSMENT — PAIN DESCRIPTION - ONSET: ONSET: ON-GOING

## 2023-01-03 ASSESSMENT — PAIN DESCRIPTION - PAIN TYPE: TYPE: SURGICAL PAIN;ACUTE PAIN

## 2023-01-03 NOTE — PROGRESS NOTES
Report called to [facility]. Instructed on Pt's boot and weight bearing status. 1 PIV removed. All belongings with pt.

## 2023-01-03 NOTE — PLAN OF CARE
Problem: Safety - Adult  Goal: Free from fall injury  1/3/2023 1259 by Alexia Kauffman RN  Outcome: Progressing   All fall precautions in place. Bed locked and in lowest position with alarm on. Overbed table and personal belonings within reach. Call light within reach and patient instructed to use call light for assistance. Non-skid socks on. Problem: Pain  Goal: Verbalizes/displays adequate comfort level or baseline comfort level  1/3/2023 1259 by Alexia Kauffman RN  Outcome: Progressing   Pt endorsing pain to BUE, and LLE. Being treated with PRN pain medication, rest, and frequent repositioning with pillow support for comfort and pressure relief. Pt reports some relief from pain with above interventions.

## 2023-01-03 NOTE — PROGRESS NOTES
Physical Therapy  Facility/Department: [facility]  Treatment    Name: Ema Rhodes  : 1945  MRN: [id number]  Date of Service: 1/3/2023    Discharge Recommendations:    Ema Rhodes scored a /24 on the AM-PAC short mobility form. Current research shows that an AM-PAC score of 17 or less is typically not associated with a discharge to the patient's home setting. Based on the patient's AM-PAC score and their current functional mobility deficits, it is recommended that the patient have 3-5 sessions per week of Physical Therapy at d/c to increase the patient's independence. Please see assessment section for further patient specific details. If patient discharges prior to next session this note will serve as a discharge summary. Please see below for the latest assessment towards goals. PT Equipment Recommendations  Other: defer      Patient Diagnosis(es): The primary encounter diagnosis was Fall from standing, initial encounter. Diagnoses of Fx humeral neck, right, closed, initial encounter and Closed fracture of distal end of left fibula, unspecified fracture morphology, initial encounter were also pertinent to this visit. Past Medical History:  has a past medical history of Arthritis, Blood transfusion, Hyperlipidemia, Hypertension, Hypothyroid, and Rheumatic fever. Past Surgical History:  has a past surgical history that includes Bunionectomy (); Cholecystectomy (); and shoulder surgery (Right, 2022). Assessment   Body Structures, Functions, Activity Limitations Requiring Skilled Therapeutic Intervention: Decreased functional mobility ; Decreased ROM; Decreased strength;Decreased safe awareness;Decreased endurance;Decreased balance; Increased pain  Assessment: Pt requiring max Ax1 for sit>supine, max Ax2 for sit<>stand transfers, mod Ax2 to maintain stance 30-60 sec, and Dep Ax3 for sit>sup and scooting in bed.  Pt remains heavily limited by inability to use RUE and weakness of BLE. Pt is below her baseline and would benefit from further skilled PT to maximize safety and independence with functional mobility. Treatment Diagnosis: decreased functional mobility s/p R reverse TSR and L tib/fib fx  Activity Tolerance  Activity Tolerance: Patient limited by fatigue;Patient limited by pain; Patient limited by endurance     Plan   Physcial Therapy Plan  General Plan:  (5-7)  Current Treatment Recommendations: Strengthening, ROM, Balance training, Functional mobility training, Transfer training, Endurance training, Gait training, Stair training, Neuromuscular re-education, Home exercise program, Safety education & training, Patient/Caregiver education & training, Equipment evaluation, education, & procurement, Therapeutic activities  Safety Devices  Type of Devices: All fall risk precautions in place, Bed alarm in place, Call light within reach, Gait belt, Left in bed, Nurse notified     Restrictions  Position Activity Restriction  Other position/activity restrictions: per surgeon Dr Leandro Urbano  in room with therapy currently, pt is okay with standard sling not shoulder immobilizer, okay to use short boot 2/2 pt height rather than tall boot. Subjective   General  Chart Reviewed: Yes  Additional Pertinent Hx: Pt is a 69 y.o. female admitted to [facility] on 12/29/22 s/p fall. XR chest: neg. XR L ankle: fx distal tib/fib/lateral malleolus. Ct head: neg.  12/31/22 s/p R reverse TSR. PMH: arthritis, HLD, HTN, hypothyroid. Family / Caregiver Present: No  Referring Practitioner: Yamel Foster MD  Diagnosis: fall from standing  Follows Commands: Within Functional Limits  Subjective  Subjective: Pt found supine in bed upon arrival and agreeable to therapy.          Social/Functional History  Social/Functional History  Lives With: Alone (son lives close by and can assist occ.)  Type of Home: House  Home Layout: Performs ADL's on one level, Able to Live on Main level with bedroom/bathroom, Two level  Home Access: Stairs to enter with rails  Entrance Stairs - Number of Steps: 3  Bathroom Shower/Tub: Tub/Shower unit  Bathroom Equipment: Grab bars in shower  Home Equipment: [employer], quad  Has the patient had two or more falls in the past year or any fall with injury in the past year?: Yes  ADL Assistance: [address]: Needs assistance (grocery delivery, son does laundry)  Ambulation Assistance: Independent (cane outside of home)  Transfer Assistance: Independent  Active : No    Cognition   Orientation  Overall Orientation Status: Within Functional Limits  Cognition  Overall Cognitive Status: WNL  Cognition Comment: anxious, needing encouragement     Objective   Heart Rate: 95  Heart Rate Source: Monitor  BP: (!) 149/74  BP Location: Left upper arm  BP Method: Automatic  Patient Position: Semi fowlers  MAP (Calculated): 99  Resp: 18  SpO2: 94 %  O2 Device: None (Room air)        Bed mobility  Supine to Sit: Maximum assistance (HOB raised, increased time and effort)  Sit to Supine: Dependent/Total (Dep Ax3)  Scooting: Dependent/Total (Dep x3 to boost higher in bed with lift pad)  Transfers  Sit to Stand: 2 Person Assistance;Maximum Assistance (from EOB x3 trials)  Stand to Sit: 2 Person Assistance;Maximum Assistance (to EOB x3 trials)  Comment: Pt very minimally able to march/slide BLE. Unsafe to attempt amb this date. Balance  Posture: Fair  Sitting - Static: Good  Sitting - Dynamic: Good (Pt sat EOB ~20 min performing grooming ADLs)  Standing - Static: Poor (Pt standing x3 trials with mod Ax2 for 1min, 30s, 30s.  Stance time limited by fatigue.)           Goals  Short Term Goals  Time Frame for Short Term Goals: Discharge  Short Term Goal 1: Pt will perform all bed mobility with ModAx2 - ongoing  Short Term Goal 2: Pt will perform sit to/from stand with MaxAx2 - Goal met - updated goal: Max Ax1  Short Term Goal 3: Pt will tolerate gait assessment if appropriate - ongoing  Patient Goals   Patient Goals : None stated       Education  Patient Education  Education Given To: Patient  Education Provided: Role of Therapy;Plan of Care  Education Method: Verbal  Barriers to Learning:  (anxiety)  Education Outcome: Verbalized understanding      Therapy Time   Individual Concurrent Group Co-treatment   Time In [phone]         Time Out 0840         Minutes 46           Timed Code Treatment Minutes:  46    Total Treatment Minutes:  46    If the patient is discharged before the next treatment session, this note will serve as the discharge summary.      Margaretmary Osler, PT, DPT

## 2023-01-03 NOTE — CARE COORDINATION
Case Management Assessment            Discharge Note                    Date / Time of Note: 1/3/2023 1:44 PM                  Discharge Note Completed by: PEBBLES Dubon LSW    Patient Name: Kyle Macias   YOB: 1945  Diagnosis: Fall from standing, initial encounter [W19. XXXA]  Fx humeral neck, right, closed, initial encounter [S42.211A]  Tibia/fibula fracture, right, open type I or II, initial encounter [S82.201B, S82.401B]  Closed fracture of distal end of left fibula, unspecified fracture morphology, initial encounter [J91.914T]   Date / Time: 12/29/2022  2:32 PM    Current PCP: Navi Corona MD  Clinic patient: No    Hospitalization in the last 30 days: No    Advance Directives:  Code Status: Full Code  [place] DNR form completed and on chart: No    Financial:  Payor: MEDICARE / Plan: MEDICARE PART A AND B / Product Type: *No Product type* /      Pharmacy:    [facility] [id number] [place] [address], [address] [address]  Phone: [phone] Fax: [phone]    [address] [place], [place] [id number] - f [phone]  [place] [address]  Phone: [phone] Fax: [phone]    [facility] ([address] [place] [address] [address] [place] [address] [phone] - f [phone]  [facility] [address] [address]  Phone: [phone] Fax: [phone]      Assistance purchasing medications?: Potential Assistance Purchasing Medications: No  Assistance provided by Case Management: None at this time    Does patient want to participate in local refill/ meds to beds program?:      Meds To Beds General Rules:  1. Can ONLY be done Monday- Friday between 8:30am-5pm  2. Prescription(s) must be in pharmacy by 3pm to be filled same day  3. Copy of patient's insurance/ prescription drug card and patient face sheet must be sent along with the prescription(s)  4. Cost of Rx cannot be added to hospital bill. If financial assistance is needed, please contact unit  or ;  or  CANNOT provide pharmacy voucher for patients co-pays  5. Patients can then  the prescription on their way out of the hospital at discharge, or pharmacy can deliver to the bedside if staff is available. (payment due at time of pick-up or delivery - cash, check, or card accepted)     Able to afford home medications/ co-pay costs: Yes    ADLS:  Current PT AM-PAC Score: 6 /24  Current OT AM-PAC Score: 11 /24      DISCHARGE Disposition: [facility] (SNF): [facility] Phone: [phone] Fax: [phone]    LOC at discharge: Skilled  RODRÍGUEZ Completed: Yes    Notification completed in HENS/PAS?:  Yes : CM has completed HENS online through secure website for SNF admission at [address] [place] [address]. Document ID #: [id number]    IMM Completed:   No    Transportation:  Transportation PLAN for discharge: EMS transportation   Mode of Transport: Ambulance stretcher - BLS  Reason for medical transport: Bed confined: Meets the following criteria 1) unable to get out of bed without assistance or ambulate, 2) unable to safely sit up in a wheelchair, 3) unable to maintain erect seating position in a chair for time needed for transport  Name of Transport Company:  [employer]   Phone: [phone]  Time of Transport: 4:30pm    Transport form completed: Yes      Referrals made at [facility] for outpatient continued care:  Not Applicable    Additional CM Notes:       KRISSY spoke to Pt's son, he is in agreement and will talk to Pt. KRISSY spoke to admissions at [facility] they are able to accept Pt. KRISSY scheduled transport for 4:30pm via [employer]. Report: [phone] fax:[phone]      The Plan for Transition of Care is related to the following treatment goals of Fall from standing, initial encounter [W19. XXXA]  Fx humeral neck, right, closed, initial encounter [S42.211A]  Tibia/fibula fracture, right, open type I or II, initial encounter [S82.201B, S82.401B]  Closed fracture of distal end of left fibula, unspecified fracture morphology, initial encounter [D31.733S]    The Patient and/or patient representative Malcolm Arora and her family were provided with a choice of provider and agrees with the discharge plan Yes    Freedom of choice list was provided with basic dialogue that supports the patient's individualized plan of care/goals and shares the quality data associated with the providers.  Yes    Care Transitions patient: No    PEBBLES Gaines, [facility]  Case Management Department  Ph: [phone]

## 2023-01-03 NOTE — CARE COORDINATION
ADDENDUM:   [facility] can accept pt. SW spoke to Pt's son, he is in agreement and will talk to Pt. Electronically signed by PEBBLES Valenzuela LSW on 1/3/2023 at 1:42 PM       SW spoke to admissions at [facility] they are anticipating accepting Pt today, she will follow up w/SW. SW scheduled transport for 4:30pm via [employer]. Electronically signed by PEBBLES Valenzuela LSW on 1/3/2023 at 11:47 AM      SW called Pt's son, discussed denial and other snf options, pt's son would [facility]errals. KRISSY spoke to for [employer] signed by PEBBLES Valenzuela LSW on 1/3/2023 at 11:23 AM        [facility] has denied due to low functioning. Electronically signed by PEBBLES Valenzuela LSW on 1/3/2023 at 11:19 AM    SW spoke to admissions at [facility], they have several residents in the hospital and they are having a morning meeting today to determine their bed availability. They will call SW back after their meeting.     Electronically signed by PEBBLES Valenzuela LSW on 1/3/2023 at 10:09 AM  [phone]

## 2023-01-03 NOTE — PROGRESS NOTES
VSS on room air. Aox4. Pt calm and cooperative. Pt is up assist x2 with maximove. Voiding adequately via purewick, denies pain or difficulty when urinating. Tolerating oral diet and PO fluids, denies N/V. Redness to abd folds- silver impreg dressing and powder applied to fodls, redness to sacrum- barrier cream and sacral mepilex applied. Scattered bruising present. Surgical site dressing C/D/I. Frequent repositioning with pillow support and specialty mattress provided for pressure relief and comfort. All fall precautions in place.

## 2023-01-03 NOTE — PLAN OF CARE
Problem: Safety - Adult  Goal: Free from fall injury  Note: Standard safety precautions are implemented. Pt educated and encouraged to call out for assistance. Call light in reach. Problem: Pain  Goal: Verbalizes/displays adequate comfort level or baseline comfort level  Note: Pt educated and encouraged to assess and monitor pain. Pt currently verbalizes and displays an adequate comfort level. Pt encouraged to use call light for assistance with pain. Non-pharm measures implemented for pain management and PRN medications.

## 2023-01-03 NOTE — PROGRESS NOTES
Occupational Therapy  Occupational Therapy  Daily Treatment Note  Patient Name: [facility]  MRN: [id number]    Chart Reviewed: Yes       Other position/activity restrictions: per surgeon Dr Randy Layton  in room with therapy currently, pt is okay with standard sling not shoulder immobilizer, okay to use short boot 2/2 pt height rather than tall boot. Additional Pertinent Hx: Pt is a 69 y.o. female admitted to [facility] on 12/29/22 s/p fall. XR chest: neg. XR L ankle: fx distal tib/fib/lateral malleolus. Ct head: neg.  12/31/22 s/p R reverse TSR. PMH: arthritis, HLD, HTN, hypothyroid. Subjective: Pt supine in bed upon entry, pleasant and agreeable to therapy session. Pt stating, \"I don't want to use the bed pain anymore, I want to use the toilet. \"  General Comment: Pt supine to sit Max A. Pt sat EOB Mod I ~20 min, pt Dependent to don footwear (boot LLE and socks). Pt washed face with washcloth, required total assist to comb hair. Pt with Max A x 2 sit to stand (x3 trials) in stance Mod A x 2 ~1 min + ~30sec x 2 trials. Pt Max A x 2 stand to sit . Pt Dependent x 2 for sit to supine and Dependent x 3 to boost to [facility] supine. Call light in reach and bed alarm on. Not safe to transfer pt to toilet this date.     Pain: not rated pain RUE and LLE with movement    Social/Functional History  Lives With: Alone (son lives close by and can assist occ.)  Type of Home: House  Home Layout: Performs ADL's on one level, Able to Live on Main level with bedroom/bathroom, Two level  Home Access: Stairs to enter with rails  Entrance Stairs - Number of Steps: 3  Bathroom Shower/Tub: Tub/Shower unit  Bathroom Equipment: Grab bars in [place] campus: [employer], quad  Has the patient had two or more falls in the past year or any fall with injury in the past year?: Yes  ADL Assistance: [address]: Needs assistance (grocery delivery, son does laundry)  Ambulation Assistance: Independent (cane outside of home)  Transfer Assistance: Independent  Active : No  Prior Function  ADL Assistance: Independent  Homemaking Assistance: Needs assistance (grocery delivery, son does laundry)  Ambulation Assistance: Independent (cane outside of home)  Transfer Assistance: Independent    Objective:    Cognition/Orientation: Limited insight into deficits/abilities - pt wanting to ambulate to toilet, limited insight into need for safety, Ox4    Bed mobility   Supine to sit: Max A  Sit to Supine: Dependent x 2  Scooting:Dep x 3 to boost to [facility] supine    Functional Mobility   Sit to Stand: Max A x2 (x3 trials)  Stand to Sit:Max A x 2 (x 3 trials)  Bed to Chair Transfer: N/A  Commode Transfer:N/A    ADLs   Grooming: Mod A - assist to comb hair, pt washed face (seated EOB with setup)  Bathing:N/A  UB dressing:N/A  LB dressing:Dependent footwear  Toileting:Dependent pure wik  Other:    UE Exercises N/a    Activity Tolerance: Fair - limited by pain and fatigue      Patient Education: transfer training, precautions, d/c rec, role of OT    Safety Devices in Place:bed alarm on and call light in reach    Assessment: Pt remains very limited by NWB of RUE and weakness of BLE. Pt requiring max Ax1 for sit>supine, max Ax2 for sit<>stand transfers, mod Ax2 to maintain stance 30-60 sec x 3trials, and Dep Ax3 for sit>sup and scooting in bed. Pt is functioning well below her baseline prior to admission, pt lives alone and ambulates with quad cane. Recommend continued inpt therapy at d/c to maximize functional independence and safety. Continue with POC. Discharge Recommendations: Derek Duran scored a 11/24 on the AM-PAC ADL Inpatient form. Current research shows that an AM-PAC score of 17 or less is typically not associated with a discharge to the patient's home setting.  Based on the patient's AM-PAC score and their current ADL deficits, it is recommended that the patient have 3-5 sessions per week of Occupational Therapy at d/c to increase the patient's independence. Please see assessment section for further patient specific details. If patient discharges prior to next session this note will serve as a discharge summary. Please see below for the latest assessment towards goals. Equipment Needs:  Defer to next setting    Second Session Therapy Time:   Individual Concurrent Group Co-treatment   Time In 0754         Time Out 0840         Minutes 46         Timed Code Treatment Minutes:  46  Total Treatment Minutes:46       Goals:  Short Term Goals  Time Frame for Short Term Goals: dc  Short Term Goal 1: supine to sit with ModA x1  Short Term Goal 2: sit balance static and dynamic with SBA - goal met 1/3  Short Term Goal 3: grooming with spvn seated EOB  Short Term Goal 4: feeding with SPVN         Plan:      Times Per Week: 7        If patient is discharged prior to next treatment, this note will serve as the discharge summary.   Gunnar Gardner, [address]

## 2023-01-06 NOTE — PROGRESS NOTES
Physician Progress Note      PATIENTChkishan Martinez  CSN #:                  [id number]  :                       1945  ADMIT DATE:       2022 2:32 PM  [address] [place] DATE:        1/3/2023 5:50 PM  RESPONDING  PROVIDER #:        Katie Ram MD          QUERY TEXT:    Patient admitted with  Humerus fracture and noted to have WBC 20.3 . If   possible, please document in progress notes and discharge summary if you are   evaluating and/or treating any of the following: The medical record reflects the following:  Risk Factors: Volume depletion  Clinical Indicators: BUN 21 WBC 20.3  IVF bolus 2,000 ml  BUN 21   and BUN 19   Creat 0.9   1.0 1/3 0.9  Per d/c summary, \"Leukocytosis, likely reactive,   resolved. \"  H&P: \"Overall patient did look volume dry with increased   respiratory effort as well as dry mucous membranes of was given IV fluid   boluses. \"  Treatment: 2,000 ML fluid bolus, lab monitoring  Options provided:  -- SIRS of non-infectious origin due to dehydration without acute organ   dysfunction, now resolving  -- Other - I will add my own diagnosis  -- Disagree - Not applicable / Not valid  -- Disagree - Clinically unable to determine / Unknown  -- Refer to Clinical Documentation Reviewer    PROVIDER RESPONSE TEXT:    This patient has SIRS of non-infectious origin due to dehydration without   acute organ dysfunction, now resolving.     Query created by: Lucian Hernandez on 1/3/2023 2:25 PM      Electronically signed by:  Katie Ram MD 2023 2:50 AM

## 2024-11-11 NOTE — ANESTHESIA PROCEDURE NOTES
Peripheral Block    Patient location during procedure: OR  Reason for block: procedure for pain, post-op pain management and at surgeon's request  Start time: 12/31/2022 8:40 AM  Staffing  Performed: anesthesiologist   Preanesthetic Checklist  Completed: patient identified, IV checked, site marked, risks and benefits discussed, surgical/procedural consents, equipment checked, pre-op evaluation, timeout performed, anesthesia consent given, oxygen available, monitors applied/VS acknowledged, fire risk safety assessment completed and verbalized and blood product R/B/A discussed and consented  Peripheral Block   Patient position: supine  Prep: ChloraPrep  Patient monitoring: cardiac monitor, continuous pulse ox, continuous capnometry, frequent blood pressure checks, IV access and oxygen  Block type: Brachial plexus  Interscalene  Laterality: right  Injection technique: single-shot  Guidance: ultrasound guided    Needle   Needle gauge: 25 G  Needle localization: anatomical landmarks and ultrasound guidance  Assessment   Injection assessment: negative aspiration for heme, no paresthesia on injection, local visualized surrounding nerve on ultrasound and no intravascular symptoms  Paresthesia pain: none  Slow fractionated injection: yes  Hemodynamics: stable  Real-time US image taken/store: yes  Outcomes: uncomplicated    Additional Notes  Sterile prep. Timeout with OR RN. 100 mcg fentanyl IV for pt comfort. 20 mL 0.5% Bupivacaine + 20 mL exparel injected in 5 mL increments following negative aspiration. Tip of needle in view at all times. No pain or paresthesias on injection. Pt tolerated the procedure well. [General Appearance - Alert] : alert [General Appearance - In No Acute Distress] : in no acute distress [Sclera] : the sclera and conjunctiva were normal [PERRL With Normal Accommodation] : pupils were equal in size, round, reactive to light [Extraocular Movements] : extraocular movements were intact [Outer Ear] : the ears and nose were normal in appearance [Oropharynx] : the oropharynx was normal with no thrush [Neck Appearance] : the appearance of the neck was normal [Neck Cervical Mass (___cm)] : no neck mass was observed [Jugular Venous Distention Increased] : there was no jugular-venous distention [Thyroid Diffuse Enlargement] : the thyroid was not enlarged [Heart Rate And Rhythm] : heart rate was normal and rhythm regular [Heart Sounds] : normal S1 and S2 [Heart Sounds Gallop] : no gallops [Murmurs] : no murmurs [Heart Sounds Pericardial Friction Rub] : no pericardial rub [Full Pulse] : the pedal pulses are present [Edema] : there was no peripheral edema [Bowel Sounds] : normal bowel sounds [Abdomen Soft] : soft [Abdomen Tenderness] : non-tender [Abdomen Mass (___ Cm)] : no abdominal mass palpated [Costovertebral Angle Tenderness] : no CVA tenderness [No Palpable Adenopathy] : no palpable adenopathy [Musculoskeletal - Swelling] : no joint swelling [Nail Clubbing] : no clubbing  or cyanosis of the fingernails [Motor Tone] : muscle strength and tone were normal [Skin Color & Pigmentation] : normal skin color and pigmentation [] : no rash

## (undated) DEVICE — 3M™ COBAN™ NL STERILE NON-LATEX SELF-ADHERENT WRAP, 2086S, 6 IN X 5 YD (15 CM X 4,5 M), 12 ROLLS/CASE: Brand: 3M™ COBAN™

## (undated) DEVICE — PAD DRY FLOOR ABS 32X58IN GRN

## (undated) DEVICE — SUTURE VCRL SZ 2-0 L18IN ABSRB UD CT-1 L36MM 1/2 CIR J839D

## (undated) DEVICE — DRAPE ADAPTABLE ARM POSITIONER

## (undated) DEVICE — SOLUTION IV IRRIG WATER 1000ML POUR BRL 2F7114

## (undated) DEVICE — SUTURE MCRYL SZ 4-0 L27IN ABSRB UD L19MM PS-2 1/2 CIR PRIM Y426H

## (undated) DEVICE — SLING ARM XL L20.25IN D9IN COT POLY BILAT WEB SHLDR STRP

## (undated) DEVICE — UNDERGLOVE SURG SZ 8 BLU LTX FREE SYN POLYISOPRENE POLYMER

## (undated) DEVICE — SOLUTION IV 1000ML 0.9% SOD CHL

## (undated) DEVICE — SST TWIST DRILL, STANDARD, 2.4MM DIA. X 127MM: Brand: MICROAIRE®

## (undated) DEVICE — Device

## (undated) DEVICE — GLOVE ORTHO 8   MSG9480

## (undated) DEVICE — DRESSING FOAM ABSORBENT 8X4 IN BORDER SELF ADH MEPILEX

## (undated) DEVICE — SOLUTION IRRIG 3000ML 0.9% SOD CHL USP UROMATIC PLAS CONT

## (undated) DEVICE — TOWEL,STOP FLAG GOLD N-W: Brand: MEDLINE

## (undated) DEVICE — GLOVE ORANGE PI 8   MSG9080

## (undated) DEVICE — GOWN,SIRUS,POLYRNF,BRTHSLV,XL,30/CS: Brand: MEDLINE

## (undated) DEVICE — SYSTEM SKIN CLSR 22CM DERMBND PRINEO

## (undated) DEVICE — SUTURE ETHBND EXCEL SZ 2 L30IN NONABSORBABLE GRN L40MM V-37 MX69G

## (undated) DEVICE — GOWN,SIRUS,POLYRNF,BRTHSLV,XLN/XXL,18/CS: Brand: MEDLINE

## (undated) DEVICE — GOWN,REINFRCE,POLY,ECLIPSE,SLV,3XLG: Brand: MEDLINE

## (undated) DEVICE — TOTAL SHOULDER: Brand: MEDLINE INDUSTRIES, INC.

## (undated) DEVICE — SUTURE VCRL SZ 0 L18IN ABSRB UD L36MM CT-1 1/2 CIR J840D